# Patient Record
Sex: MALE | Race: BLACK OR AFRICAN AMERICAN | NOT HISPANIC OR LATINO | URBAN - METROPOLITAN AREA
[De-identification: names, ages, dates, MRNs, and addresses within clinical notes are randomized per-mention and may not be internally consistent; named-entity substitution may affect disease eponyms.]

---

## 2018-04-22 ENCOUNTER — INPATIENT (INPATIENT)
Facility: HOSPITAL | Age: 63
LOS: 6 days | Discharge: HOME | DRG: 26 | End: 2018-04-29
Attending: NEUROLOGICAL SURGERY | Admitting: NEUROLOGICAL SURGERY
Payer: COMMERCIAL

## 2018-04-22 VITALS
TEMPERATURE: 98 F | OXYGEN SATURATION: 97 % | DIASTOLIC BLOOD PRESSURE: 118 MMHG | HEART RATE: 119 BPM | SYSTOLIC BLOOD PRESSURE: 165 MMHG | RESPIRATION RATE: 20 BRPM

## 2018-04-22 DIAGNOSIS — I62.00 NONTRAUMATIC SUBDURAL HEMORRHAGE, UNSPECIFIED: ICD-10-CM

## 2018-04-22 LAB
ALBUMIN SERPL ELPH-MCNC: 4.1 G/DL — SIGNIFICANT CHANGE UP (ref 3.3–5)
ALBUMIN SERPL ELPH-MCNC: 4.4 G/DL — SIGNIFICANT CHANGE UP (ref 3.3–5)
ALP SERPL-CCNC: 68 U/L — SIGNIFICANT CHANGE UP (ref 40–120)
ALP SERPL-CCNC: 68 U/L — SIGNIFICANT CHANGE UP (ref 40–120)
ALT FLD-CCNC: 18 U/L — SIGNIFICANT CHANGE UP (ref 10–45)
ALT FLD-CCNC: 18 U/L — SIGNIFICANT CHANGE UP (ref 10–45)
ANION GAP SERPL CALC-SCNC: 18 MMOL/L — HIGH (ref 5–17)
APTT BLD: 30.8 SEC — SIGNIFICANT CHANGE UP (ref 27.5–37.4)
AST SERPL-CCNC: 14 U/L — SIGNIFICANT CHANGE UP (ref 10–40)
AST SERPL-CCNC: 15 U/L — SIGNIFICANT CHANGE UP (ref 10–40)
BASOPHILS # BLD AUTO: 0 K/UL — SIGNIFICANT CHANGE UP (ref 0–0.2)
BASOPHILS NFR BLD AUTO: 0.4 % — SIGNIFICANT CHANGE UP (ref 0–2)
BILIRUB DIRECT SERPL-MCNC: 0.2 MG/DL — SIGNIFICANT CHANGE UP (ref 0–0.2)
BILIRUB INDIRECT FLD-MCNC: 0.5 MG/DL — SIGNIFICANT CHANGE UP (ref 0.2–1)
BILIRUB SERPL-MCNC: 0.6 MG/DL — SIGNIFICANT CHANGE UP (ref 0.2–1.2)
BILIRUB SERPL-MCNC: 0.7 MG/DL — SIGNIFICANT CHANGE UP (ref 0.2–1.2)
BLD GP AB SCN SERPL QL: NEGATIVE — SIGNIFICANT CHANGE UP
BUN SERPL-MCNC: 12 MG/DL — SIGNIFICANT CHANGE UP (ref 7–23)
CALCIUM SERPL-MCNC: 9.5 MG/DL — SIGNIFICANT CHANGE UP (ref 8.4–10.5)
CHLORIDE SERPL-SCNC: 104 MMOL/L — SIGNIFICANT CHANGE UP (ref 96–108)
CO2 SERPL-SCNC: 22 MMOL/L — SIGNIFICANT CHANGE UP (ref 22–31)
CREAT SERPL-MCNC: 1.27 MG/DL — SIGNIFICANT CHANGE UP (ref 0.5–1.3)
EOSINOPHIL # BLD AUTO: 0 K/UL — SIGNIFICANT CHANGE UP (ref 0–0.5)
EOSINOPHIL NFR BLD AUTO: 0.3 % — SIGNIFICANT CHANGE UP (ref 0–6)
GLUCOSE SERPL-MCNC: 96 MG/DL — SIGNIFICANT CHANGE UP (ref 70–99)
HCT VFR BLD CALC: 43.6 % — SIGNIFICANT CHANGE UP (ref 39–50)
HGB BLD-MCNC: 14.5 G/DL — SIGNIFICANT CHANGE UP (ref 13–17)
INR BLD: 1.12 RATIO — SIGNIFICANT CHANGE UP (ref 0.88–1.16)
LYMPHOCYTES # BLD AUTO: 2.6 K/UL — SIGNIFICANT CHANGE UP (ref 1–3.3)
LYMPHOCYTES # BLD AUTO: 27.1 % — SIGNIFICANT CHANGE UP (ref 13–44)
MAGNESIUM SERPL-MCNC: 2.2 MG/DL — SIGNIFICANT CHANGE UP (ref 1.6–2.6)
MCHC RBC-ENTMCNC: 29.7 PG — SIGNIFICANT CHANGE UP (ref 27–34)
MCHC RBC-ENTMCNC: 33.2 GM/DL — SIGNIFICANT CHANGE UP (ref 32–36)
MCV RBC AUTO: 89.7 FL — SIGNIFICANT CHANGE UP (ref 80–100)
MONOCYTES # BLD AUTO: 0.9 K/UL — SIGNIFICANT CHANGE UP (ref 0–0.9)
MONOCYTES NFR BLD AUTO: 9.7 % — SIGNIFICANT CHANGE UP (ref 2–14)
NEUTROPHILS # BLD AUTO: 5.9 K/UL — SIGNIFICANT CHANGE UP (ref 1.8–7.4)
NEUTROPHILS NFR BLD AUTO: 62.6 % — SIGNIFICANT CHANGE UP (ref 43–77)
PHOSPHATE SERPL-MCNC: 3.7 MG/DL — SIGNIFICANT CHANGE UP (ref 2.5–4.5)
PLATELET # BLD AUTO: 163 K/UL — SIGNIFICANT CHANGE UP (ref 150–400)
POTASSIUM SERPL-MCNC: 4 MMOL/L — SIGNIFICANT CHANGE UP (ref 3.5–5.3)
POTASSIUM SERPL-SCNC: 4 MMOL/L — SIGNIFICANT CHANGE UP (ref 3.5–5.3)
PROT SERPL-MCNC: 7.4 G/DL — SIGNIFICANT CHANGE UP (ref 6–8.3)
PROT SERPL-MCNC: 7.6 G/DL — SIGNIFICANT CHANGE UP (ref 6–8.3)
PROTHROM AB SERPL-ACNC: 12.1 SEC — SIGNIFICANT CHANGE UP (ref 9.8–12.7)
RBC # BLD: 4.86 M/UL — SIGNIFICANT CHANGE UP (ref 4.2–5.8)
RBC # FLD: 12.5 % — SIGNIFICANT CHANGE UP (ref 10.3–14.5)
RH IG SCN BLD-IMP: NEGATIVE — SIGNIFICANT CHANGE UP
RH IG SCN BLD-IMP: NEGATIVE — SIGNIFICANT CHANGE UP
SODIUM SERPL-SCNC: 144 MMOL/L — SIGNIFICANT CHANGE UP (ref 135–145)
WBC # BLD: 9.5 K/UL — SIGNIFICANT CHANGE UP (ref 3.8–10.5)
WBC # FLD AUTO: 9.5 K/UL — SIGNIFICANT CHANGE UP (ref 3.8–10.5)

## 2018-04-22 PROCEDURE — 70496 CT ANGIOGRAPHY HEAD: CPT | Mod: 26

## 2018-04-22 PROCEDURE — 71045 X-RAY EXAM CHEST 1 VIEW: CPT | Mod: 26

## 2018-04-22 PROCEDURE — 70450 CT HEAD/BRAIN W/O DYE: CPT | Mod: 26,59

## 2018-04-22 PROCEDURE — 99291 CRITICAL CARE FIRST HOUR: CPT

## 2018-04-22 PROCEDURE — 93010 ELECTROCARDIOGRAM REPORT: CPT

## 2018-04-22 RX ORDER — INFLUENZA VIRUS VACCINE 15; 15; 15; 15 UG/.5ML; UG/.5ML; UG/.5ML; UG/.5ML
0.5 SUSPENSION INTRAMUSCULAR ONCE
Qty: 0 | Refills: 0 | Status: COMPLETED | OUTPATIENT
Start: 2018-04-22 | End: 2018-04-22

## 2018-04-22 RX ORDER — LABETALOL HCL 100 MG
10 TABLET ORAL ONCE
Qty: 0 | Refills: 0 | Status: COMPLETED | OUTPATIENT
Start: 2018-04-22 | End: 2018-04-22

## 2018-04-22 RX ORDER — ACETAMINOPHEN 500 MG
325 TABLET ORAL EVERY 4 HOURS
Qty: 0 | Refills: 0 | Status: DISCONTINUED | OUTPATIENT
Start: 2018-04-22 | End: 2018-04-26

## 2018-04-22 RX ORDER — DEXTROSE MONOHYDRATE, SODIUM CHLORIDE, AND POTASSIUM CHLORIDE 50; .745; 4.5 G/1000ML; G/1000ML; G/1000ML
1000 INJECTION, SOLUTION INTRAVENOUS
Qty: 0 | Refills: 0 | Status: DISCONTINUED | OUTPATIENT
Start: 2018-04-22 | End: 2018-04-23

## 2018-04-22 RX ADMIN — DEXTROSE MONOHYDRATE, SODIUM CHLORIDE, AND POTASSIUM CHLORIDE 75 MILLILITER(S): 50; .745; 4.5 INJECTION, SOLUTION INTRAVENOUS at 05:07

## 2018-04-22 RX ADMIN — Medication 10 MILLIGRAM(S): at 03:04

## 2018-04-22 NOTE — ED PROVIDER NOTE - OBJECTIVE STATEMENT
Patient with hx HTN uncontrolled with meds p/w atraumatic SDH that started while he was smoking cocaine with some friends.  Lost consciousness and had seizure.  Was brought in to [facility] by friends and had another seizure, received 2mg ativan and 1g keppra.  Had CT which showed 1 frontal and 1 frontal/parietal collection with 2mm shift hematoma vs neoplasm.  Was transferred for neurosurgery evaluation.  Patient denies symptoms at this time except mild nausea, no headache Patient with hx HTN uncontrolled with meds p/w atraumatic SDH. reportedly started having a seizure while he was smoking cocaine with some friends.  Lost consciousness w seizure activity.  Was brought in to [facility] by friends and had another seizure, received 2mg ativan and 1g keppra.  Had CT which showed 1 frontal and 1 frontal/parietal collection with 2mm shift hematoma vs neoplasm.  Was transferred for neurosurgery evaluation.  Patient denies symptoms at this time except mild nausea, no headache

## 2018-04-22 NOTE — H&P ADULT - ASSESSMENT
62M p/w seizure with left extra-axial mass vs hematoma    CTH w / w/o contrast   c/w keppra  CT CAP  Q4 neurochecks  MRI w/ w/o contrast 62M p/w seizure with left extra-axial mass vs hematoma    CTH w / w/o contrast   c/w keppra  CT CAP  Q4 neurochecks  MRI w/ w/o contrast  CIWA protocol 62M p/w seizure with left extra-axial mass vs hematoma    CTA  c/w keppra  CT CAP  Q4 neurochecks  MRI w/ w/o contrast  CIWA protocol

## 2018-04-22 NOTE — ED ADULT NURSE REASSESSMENT NOTE - NS ED NURSE REASSESS COMMENT FT1
Comfort care measures performed.
Patient is sleeping, does not appear to be in distress.  Safety maintained.  Still pending bed assignment upstairs.
Patient still at CT.
Pharmacy called for IV fluids, will send down.

## 2018-04-22 NOTE — ED PROVIDER NOTE - ATTENDING CONTRIBUTION TO CARE
63 yo M transferred from [facility] for CTH demonstrating 3x5cm frontoparietal and 6x2 cm frontal lobe collections that are extra-axial collections, acute on chronic hematomas versus underlying neoplasm/malignancy causing 2mm shift.   patient 63 yo M transferred from [facility] for CTH demonstrating 3x5cm frontoparietal and 6x2 cm frontal lobe collections that are extra-axial collections, acute on chronic hematomas versus underlying neoplasm/malignancy causing 2mm shift.   patient was doing cocaine and drinking when he started seizing. he was brought to OT. seizure aborted with keppra and ativan. CTH done at [facility]. at this time patient has no complaints. he denies any HA/neck pain, cp, sob, abd pain, weakness. His speech sounds slurred but he denies any slurred speech.   PE with + dysarthria, comprehension intact, unequal smile. 5/5 strength in all 4 extremities, although R side appears weaker than the L side.  already received keppra 1 g at [facility]. 61 yo M transferred from [facility] for CTH demonstrating 3x5cm frontoparietal and 6x2 cm frontal lobe collections that are extra-axial collections, acute on chronic hematomas versus underlying neoplasm/malignancy causing 2mm shift.   patient was doing cocaine and drinking when he started seizing. he was brought to [facility]. seizure aborted with keppra 1g and ativan. CTH done at [facility]. at this time patient has no complaints. he denies any HA/neck pain, cp, sob, abd pain, weakness. His speech sounds slurred but he denies any slurred speech.   PE with + dysarthria, comprehension intact, unequal smile. 5/5 strength in all 4 extremities, although R side appears weaker than the L side.  already received keppra 1 g at [facility].  Repeat CT head and CTA was obtained per the request. They did not recommend initiation of any other medications. Patient was admitted to [facility]  service in stable condition    Based on patient's HPI as well as the results of today's workup, I felt that patient warranted admission to the hospital for further workup/evaluation and continued management. I discussed the findings of today's workup with the patient and addressed the patient's questions and concerns. The patient was agreeable with admission. I spoke with [facility] who accepted the patient for admission and subsequently took over the patient's care.

## 2018-04-22 NOTE — H&P ADULT - HISTORY OF PRESENT ILLNESS
This is a 62 year old male tx from [facility] history of cocaine use / ETOH abuse presented intially with seizures. Was given keppra / ativan with effect. CTH was completed at outside hospital with suggestion of chronic hematoma vs left extraxial mass. There is 2 mm of shift. On presentation at [facility] he is GCS 15. Severely dysarthric. Denies HA, cervical pain, blurry vision, nausea or vomiting.    PMHX: none    Meds: none    SH: cocaine abuse, lives in far [place]	    CTH: left frontal and high left fronto/parietal extraxial lesions measuring 5.8cm and 5.9 cm respectively. This is a 62 year old male tx from [facility] history of cocaine use / ETOH abuse / marijuana use presented initially with seizures. Was found shaking, drooling with eyes open, unable to speak for 1 hour. Had similar episode as per friend prior in apartment prior to calling 911. Was given keppra / ativan with effect. CTH was completed at outside hospital with suggestion of chronic hematoma vs left extraxial mass. There is 2 mm of shift. On presentation at [facility] he is GCS 15. Severely dysarthric. Denies HA, cervical pain, blurry vision, nausea or vomiting. Hypertensive 203/115 in ED, was given labetalol with effect.     PMHX: none    Meds: none    SH: cocaine abuse, lives in far [place]	    CTH: left frontal and high left fronto/parietal extraxial lesions vs SDH measuring 5.8cm and 5.9 cm respectively. This is a 62 year old male tx from [facility] history of cocaine use / ETOH abuse / marijuana use presented initially with seizures. Was found shaking, drooling with eyes open, unable to speak for 1 hour. Had similar episode as per friend prior in apartment prior to calling 911. Was given keppra / ativan with effect. CTH was completed at outside hospital with suggestion of chronic hematoma vs left extraxial mass. There is 2 mm of shift. On presentation at [facility] he is GCS 15. Severely dysarthric. Denies HA, cervical pain, blurry vision, nausea or vomiting. Hypertensive 203/115 in ED, was given labetalol with effect. Afebrile. No leukocytosis.     PMHX: none    Meds: none    SH: cocaine abuse, lives in far [place]	    CTH: left frontal and high left fronto/parietal extraxial lesions vs SDH measuring 5.8cm and 5.9 cm respectively.

## 2018-04-22 NOTE — ED PROVIDER NOTE - CRITICAL CARE PROVIDED
additional history taking/consultation with other physicians/consult w/ pt's family directly relating to pts condition/direct patient care (not related to procedure)/interpretation of diagnostic studies/documentation

## 2018-04-22 NOTE — ED PROVIDER NOTE - NEUROLOGICAL, MLM
Alert and oriented, speech normal, CN II-XII intact except slight L lower facial droop, no focal deficits, no motor or sensory deficits, no pronator drift, finger to nose testing normal Alert and oriented, speech slurred, CN II-XII intact except slight L lower facial droop, no focal deficits, no motor or sensory deficits, no pronator drift, finger to nose testing normal

## 2018-04-22 NOTE — ED PROVIDER NOTE - CARE PLAN
Principal Discharge DX:	SDH (subdural hematoma) Principal Discharge DX:	SDH (subdural hematoma)  Secondary Diagnosis:	Cocaine abuse  Secondary Diagnosis:	Seizure

## 2018-04-22 NOTE — PROGRESS NOTE ADULT - ASSESSMENT
HPI:  This is a 62 year old male tx from [facility] history of cocaine use / ETOH abuse / marijuana use presented initially with seizures, CTH demonstrates L extra axial collection, likely subdural.   -Repeat CTH today   - q. 4 hour neuro checks   - CIWA   - Medical clearance for possible OR for drainage   - pain control This is a 62 year old male tx from [facility] history of cocaine use / ETOH abuse / marijuana use presented initially with seizures, CTH demonstrates L extra axial collection, likely subdural.   -Repeat CTH today   - q. 4 hour neuro checks   - CIWA   - Medical clearance for possible OR for drainage   - pain control

## 2018-04-22 NOTE — PROGRESS NOTE ADULT - SUBJECTIVE AND OBJECTIVE BOX
Overnight Events: None    Subjective: Patient seen and examined at bedside    Vital Signs Last 24 Hrs  T(C): 36.4 (22 Apr 2018 08:57), Max: 36.7 (22 Apr 2018 02:53)  T(F): 97.6 (22 Apr 2018 08:57), Max: 98 (22 Apr 2018 02:53)  HR: 71 (22 Apr 2018 08:57) (71 - 119)  BP: 166/94 (22 Apr 2018 08:57) (147/93 - 172/113)  BP(mean): --  RR: 18 (22 Apr 2018 08:57) (18 - 20)  SpO2: 94% (22 Apr 2018 08:57) (94% - 99%)                          14.5   9.5   )-----------( 163      ( 22 Apr 2018 03:07 )             43.6    04-22    144  |  104  |  12  ----------------------------<  96  4.0   |  22  |  1.27    Ca    9.5      22 Apr 2018 03:07  Phos  3.7     04-22  Mg     2.2     04-22    TPro  7.4  /  Alb  4.1  /  TBili  0.7  /  DBili  0.2  /  AST  14  /  ALT  18  /  AlkPhos  68  04-22  PT/INR - ( 22 Apr 2018 03:07 )   PT: 12.1 sec;   INR: 1.12 ratio         PTT - ( 22 Apr 2018 03:07 )  PTT:30.8 sec   Stroke Core Measures        DRAIN OUTPUT:     NEUROIMAGING:     PHYSICAL EXAM:    Constitutional: No Acute Distress     Neurological: AOx3, Following Commands, Moving all Extremities     Motor exam:          Upper extremity                         Delt     Bicep     Tricep    HG                                                 R         5/5        5/5        5/5       5/5                                               L          5/5        5/5        5/5       5/5          Lower extremity                        HF         KF        KE       DF         PF                                                  R        5/5        5/5        5/5       5/5         5/5                                               L         5/5        5/5       5/5       5/5          5/5                                                 Sensation: [X] intact to light touch  [] decreased:     Pulm: No respiratroy distress or accessory muscle use    Gastrointestinal: Soft, Non-tender, Non-distended     Extremities: No calf tenderness       MEDICATIONS:   Antibiotics:    Neuro:  acetaminophen   Tablet. 325 milliGRAM(s) Oral every 4 hours PRN Mild Pain (1 - 3)  LORazepam   Injectable 2 milliGRAM(s) IV Push every 2 hours PRN CIWA-Ar score increase by 2 points and a total score of 7 or less    Anticoagulation:    Cardiology:    Endo:     Pulm:    GI/:    Other:  sodium chloride 0.9% with potassium chloride 20 mEq/L 1000 milliLiter(s) IV Continuous <Continuous> Overnight Events: None    Subjective: Patient seen and examined at bedside    Vital Signs Last 24 Hrs  T(C): 36.4 (22 Apr 2018 08:57), Max: 36.7 (22 Apr 2018 02:53)  T(F): 97.6 (22 Apr 2018 08:57), Max: 98 (22 Apr 2018 02:53)  HR: 71 (22 Apr 2018 08:57) (71 - 119)  BP: 166/94 (22 Apr 2018 08:57) (147/93 - 172/113)  BP(mean): --  RR: 18 (22 Apr 2018 08:57) (18 - 20)  SpO2: 94% (22 Apr 2018 08:57) (94% - 99%)                          14.5   9.5   )-----------( 163      ( 22 Apr 2018 03:07 )             43.6    04-22    144  |  104  |  12  ----------------------------<  96  4.0   |  22  |  1.27    Ca    9.5      22 Apr 2018 03:07  Phos  3.7     04-22  Mg     2.2     04-22    TPro  7.4  /  Alb  4.1  /  TBili  0.7  /  DBili  0.2  /  AST  14  /  ALT  18  /  AlkPhos  68  04-22  PT/INR - ( 22 Apr 2018 03:07 )   PT: 12.1 sec;   INR: 1.12 ratio         PTT - ( 22 Apr 2018 03:07 )  PTT:30.8 sec   Stroke Core Measures        DRAIN OUTPUT:     NEUROIMAGING:     PHYSICAL EXAM:    Constitutional: No Acute Distress     Neurological: AOx3, Following Commands, Moving all Extremities     Motor exam:          Upper extremity                         Delt     Bicep     Tricep    HG                                                 R         5/5        5/5        5/5       5/5                                               L          5/5        5/5        5/5       5/5          Lower extremity                        HF         KF        KE       DF         PF                                                  R        5/5        5/5        5/5       5/5         5/5                                               L         5/5        5/5       5/5       5/5          5/5                                                 Sensation: [X] intact to light touch  [] decreased:     Pulm: No respiratroy distress or accessory muscle use    Gastrointestinal: Soft, Non-tender, Non-distended     Extremities: No calf tenderness       MEDICATIONS:   Antibiotics:    Neuro:  acetaminophen   Tablet. 325 milliGRAM(s) Oral every 4 hours PRN Mild Pain (1 - 3)  LORazepam   Injectable 2 milliGRAM(s) IV Push every 2 hours PRN CIWA-Ar score increase by 2 points and a total score of 7 or less    Anticoagulation:    Cardiology:    Endo:     Pulm:    GI/:    Other:  sodium chloride 0.9% with potassium chloride 20 mEq/L 1000 milliLiter(s) IV Continuous <Continuous>      Imaging:   < from: CT Head No Cont (04.22.18 @ 04:51) >  Impression: Chronic left frontal-parietal subdural hematoma with mass   effect exerted on the subjacent brain, described. There are acute   components of the subdural hematoma characterized by higher density. CTA   of the head demonstrates no abnormalities.    < end of copied text >

## 2018-04-22 NOTE — ED ADULT NURSE NOTE - OBJECTIVE STATEMENT
Patient transferred from [facility] for subdural hematoma.  Patient is daily drinker, last known drink witnessed by friend at bedside was 2100 prior to bringing patient Patient transferred from [facility] for subdural hematoma.  Per EMS, patient was brought to [facility] ER by friends who witnessed tonic seizure at home.  Patient also had witnessed episode of tonic seizure in ER.  Patient received Ativan prior to transport due to tachycardia and hypertension.  Upon arrival to this ED, patient hypertensive 160s systollically and 110s diastollically.  Patient is sleepy but easily arousable, A&O x3 and following commands.  Upon assessment weakness noted to upper R and lower R extremities.  Patient is daily drinker (Claudia), last known drink witnessed by friend at bedside was 2100 prior to bringing patient to hospital.  Patient admits to using marijuana and cocaine.  Patient arrives with 20G to LA that is patent.  ED Attending at the bedside for evaluation. Patient transferred from [facility] for subdural hematoma.  Per EMS, patient was brought to [facility] ER by friends who witnessed tonic seizure at home.  Patient also had witnessed episode of tonic seizure in ER.  Patient received Keppra PTA.  Received Ativan prior to transport due to tachycardia and hypertension.  Upon arrival to this ED, patient hypertensive 160s systollically and 110s diastollically.  Patient is drowsy but easily arousable, A&O x3 and following commands.  Upon assessment weakness noted to upper R and lower R extremities.  Patient is daily drinker (Claudia), last known drink witnessed by friend at bedside was 2100 prior to bringing patient to hospital.  Patient admits to using marijuana and cocaine.  Patient arrives with 20G to LA that is patent.  ED Attending at the bedside for evaluation.

## 2018-04-23 DIAGNOSIS — F10.10 ALCOHOL ABUSE, UNCOMPLICATED: ICD-10-CM

## 2018-04-23 DIAGNOSIS — F14.10 COCAINE ABUSE, UNCOMPLICATED: ICD-10-CM

## 2018-04-23 DIAGNOSIS — E83.39 OTHER DISORDERS OF PHOSPHORUS METABOLISM: ICD-10-CM

## 2018-04-23 DIAGNOSIS — I10 ESSENTIAL (PRIMARY) HYPERTENSION: ICD-10-CM

## 2018-04-23 DIAGNOSIS — Z29.9 ENCOUNTER FOR PROPHYLACTIC MEASURES, UNSPECIFIED: ICD-10-CM

## 2018-04-23 DIAGNOSIS — K92.1 MELENA: ICD-10-CM

## 2018-04-23 DIAGNOSIS — I62.00 NONTRAUMATIC SUBDURAL HEMORRHAGE, UNSPECIFIED: ICD-10-CM

## 2018-04-23 DIAGNOSIS — R79.89 OTHER SPECIFIED ABNORMAL FINDINGS OF BLOOD CHEMISTRY: ICD-10-CM

## 2018-04-23 LAB
MAGNESIUM SERPL-MCNC: 2.3 MG/DL — SIGNIFICANT CHANGE UP (ref 1.6–2.6)
PHOSPHATE SERPL-MCNC: 2.2 MG/DL — LOW (ref 2.5–4.5)

## 2018-04-23 PROCEDURE — 95819 EEG AWAKE AND ASLEEP: CPT | Mod: 26

## 2018-04-23 PROCEDURE — 99223 1ST HOSP IP/OBS HIGH 75: CPT

## 2018-04-23 RX ORDER — THIAMINE MONONITRATE (VIT B1) 100 MG
500 TABLET ORAL EVERY 8 HOURS
Qty: 0 | Refills: 0 | Status: DISCONTINUED | OUTPATIENT
Start: 2018-04-23 | End: 2018-04-26

## 2018-04-23 RX ORDER — DOCUSATE SODIUM 100 MG
100 CAPSULE ORAL THREE TIMES A DAY
Qty: 0 | Refills: 0 | Status: DISCONTINUED | OUTPATIENT
Start: 2018-04-23 | End: 2018-04-26

## 2018-04-23 RX ORDER — DEXTROSE MONOHYDRATE, SODIUM CHLORIDE, AND POTASSIUM CHLORIDE 50; .745; 4.5 G/1000ML; G/1000ML; G/1000ML
1000 INJECTION, SOLUTION INTRAVENOUS
Qty: 0 | Refills: 0 | Status: DISCONTINUED | OUTPATIENT
Start: 2018-04-23 | End: 2018-04-26

## 2018-04-23 RX ORDER — CALCIUM ACETATE 667 MG
667 TABLET ORAL
Qty: 0 | Refills: 0 | Status: DISCONTINUED | OUTPATIENT
Start: 2018-04-23 | End: 2018-04-23

## 2018-04-23 RX ORDER — SODIUM,POTASSIUM PHOSPHATES 278-250MG
1 POWDER IN PACKET (EA) ORAL
Qty: 0 | Refills: 0 | Status: COMPLETED | OUTPATIENT
Start: 2018-04-23 | End: 2018-04-23

## 2018-04-23 RX ORDER — SENNA PLUS 8.6 MG/1
2 TABLET ORAL AT BEDTIME
Qty: 0 | Refills: 0 | Status: DISCONTINUED | OUTPATIENT
Start: 2018-04-23 | End: 2018-04-26

## 2018-04-23 RX ORDER — THIAMINE MONONITRATE (VIT B1) 100 MG
100 TABLET ORAL DAILY
Qty: 0 | Refills: 0 | Status: DISCONTINUED | OUTPATIENT
Start: 2018-04-23 | End: 2018-04-23

## 2018-04-23 RX ORDER — AMLODIPINE BESYLATE 2.5 MG/1
5 TABLET ORAL DAILY
Qty: 0 | Refills: 0 | Status: DISCONTINUED | OUTPATIENT
Start: 2018-04-23 | End: 2018-04-26

## 2018-04-23 RX ORDER — LEVETIRACETAM 250 MG/1
1000 TABLET, FILM COATED ORAL
Qty: 0 | Refills: 0 | Status: DISCONTINUED | OUTPATIENT
Start: 2018-04-23 | End: 2018-04-26

## 2018-04-23 RX ADMIN — Medication 1 TABLET(S): at 17:29

## 2018-04-23 RX ADMIN — Medication 105 MILLIGRAM(S): at 22:05

## 2018-04-23 RX ADMIN — Medication 1 TABLET(S): at 17:26

## 2018-04-23 RX ADMIN — Medication 1 TABLET(S): at 12:41

## 2018-04-23 RX ADMIN — AMLODIPINE BESYLATE 5 MILLIGRAM(S): 2.5 TABLET ORAL at 17:51

## 2018-04-23 RX ADMIN — Medication 1 TABLET(S): at 21:49

## 2018-04-23 RX ADMIN — Medication 100 MILLIGRAM(S): at 13:11

## 2018-04-23 RX ADMIN — DEXTROSE MONOHYDRATE, SODIUM CHLORIDE, AND POTASSIUM CHLORIDE 75 MILLILITER(S): 50; .745; 4.5 INJECTION, SOLUTION INTRAVENOUS at 05:32

## 2018-04-23 RX ADMIN — LEVETIRACETAM 1000 MILLIGRAM(S): 250 TABLET, FILM COATED ORAL at 05:32

## 2018-04-23 RX ADMIN — LEVETIRACETAM 1000 MILLIGRAM(S): 250 TABLET, FILM COATED ORAL at 17:51

## 2018-04-23 RX ADMIN — Medication 667 MILLIGRAM(S): at 13:11

## 2018-04-23 RX ADMIN — Medication 100 MILLIGRAM(S): at 21:49

## 2018-04-23 NOTE — CONSULT NOTE ADULT - SUBJECTIVE AND OBJECTIVE BOX
Dr Sandor Salinas     PMD:    Patient is a 62y old  Male who presents with a chief complaint of seizure (22 Apr 2018 03:09)      HPI:  62 year old male tx from [facility] history of cocaine use / ETOH abuse / marijuana use presented initially with seizures. Was found shaking, drooling with eyes open, unable to speak for 1 hour. Had similar episode as per friend prior in apartment prior to calling 911. Was given keppra / ativan with effect. CTH was completed at outside hospital with suggestion of chronic hematoma vs left extraxial mass. There is 2 mm of shift. On presentation at [facility] he is GCS 15. Severely dysarthric. Denies HA, cervical pain, blurry vision, nausea or vomiting. Hypertensive 203/115 in ED, was given labetalol with effect. Afebrile. No leukocytosis.   CTH: left frontal and high left fronto/parietal extraxial lesions vs SDH measuring 5.8cm and 5.9 cm respectively. (22 Apr 2018 03:09)    History limited due to: [ ] Dementia  [ ] Delirium  [ ] Condition    Pain Symptoms if applicable:             	                         none	   mild         moderate         severe  Pain:	                            0	    1-3	     4-6	         7-10  Location:	  Modifying factors:	  Associated symptoms:	    Function: [ ] Independent  [ ] Assistance  [ ] Total care  [ ] Non-ambulatory  On antiplatelet or anticoagulation? [ ] YES [ ] NO  Prior anesthesia:  DASI: METS:       Allergies    No Known Allergies    Intolerances        HOME MEDICATIONS: [ ] Reviewed  Home Medications:      MEDICATIONS  (STANDING):  calcium acetate 667 milliGRAM(s) Oral three times a day with meals  docusate sodium 100 milliGRAM(s) Oral three times a day  influenza   Vaccine 0.5 milliLiter(s) IntraMuscular once  levETIRAcetam 1000 milliGRAM(s) Oral two times a day  multivitamin 1 Tablet(s) Oral daily  senna 2 Tablet(s) Oral at bedtime  thiamine 100 milliGRAM(s) Oral daily    MEDICATIONS  (PRN):  acetaminophen   Tablet. 325 milliGRAM(s) Oral every 4 hours PRN Mild Pain (1 - 3)  LORazepam   Injectable 2 milliGRAM(s) IV Push every 2 hours PRN CIWA-Ar score increase by 2 points and a total score of 7 or less      PAST MEDICAL & SURGICAL HISTORY:  HTN (hypertension)  No significant past surgical history  [ ] Reviewed     SOCIAL HISTORY:  Residence: [ ] long term  [ ] SNF  [x ] [facility]  cocaine abuse, lives in [place]	      FAMILY HISTORY:  [ ] No pertinent family history in first degree relatives     REVIEW OF SYSTEMS:    CONSTITUTIONAL: No fever, weight loss, or fatigue  EYES: No eye pain, visual disturbances, or discharge  ENMT:  No difficulty hearing, tinnitus, vertigo; No sinus or throat pain  NECK: No pain or stiffness  BREASTS: No pain, masses, or nipple discharge  RESPIRATORY: No cough, wheezing, chills or hemoptysis; No shortness of breath  CARDIOVASCULAR: No chest pain, palpitations, dizziness, or leg swelling  GASTROINTESTINAL: No abdominal or epigastric pain. No nausea, vomiting, or hematemesis; No diarrhea or constipation. No melena or hematochezia.  GENITOURINARY: No dysuria, frequency, hematuria, or incontinence  NEUROLOGICAL: No headaches, memory loss, loss of strength, numbness, or tremors  SKIN: No itching, burning, rashes, or lesions   LYMPH NODES: No enlarged glands  ENDOCRINE: No heat or cold intolerance; No hair loss  MUSCULOSKELETAL: No muscle or back pain  PSYCHIATRIC: No depression, anxiety, mood swings, or difficulty sleeping  HEME/LYMPH: No easy bruising, or bleeding gums  ALLERGY AND IMMUNOLOGIC: No hives or eczema    [  ] All other ROS negative  [  ] Unable to obtain due to poor mental status    Vital Signs Last 24 Hrs  T(C): 36.9 (23 Apr 2018 08:20), Max: 36.9 (23 Apr 2018 08:20)  T(F): 98.5 (23 Apr 2018 08:20), Max: 98.5 (23 Apr 2018 08:20)  HR: 78 (23 Apr 2018 08:20) (70 - 87)  BP: 133/70 (23 Apr 2018 08:20) (133/70 - 160/90)  BP(mean): --  RR: 18 (23 Apr 2018 08:20) (18 - 18)  SpO2: 96% (23 Apr 2018 08:20) (95% - 100%)    PHYSICAL EXAM:    GENERAL: NAD, well-groomed, well-developed  HEAD:  Atraumatic, Normocephalic  EYES: EOMI, PERRLA, conjunctiva and sclera clear  ENMT: Moist mucous membranes  NECK: Supple, No JVD  RESPIRATORY: Clear to auscultation bilaterally; No rales, rhonchi, wheezing, or rubs  CARDIOVASCULAR: Regular rate and rhythm; No murmurs, rubs, or gallops  GASTROINTESTINAL: Soft, Nontender, Nondistended; Bowel sounds present  GENITOURINARY: Not examined  EXTREMITIES:  2+ Peripheral Pulses, No clubbing, cyanosis, or edema  NEURO:  Alert & Oriented X3; Moving all 4 extremities; No gross sensory deficits  HEME/LYMPH: No lymphadenopathy noted  SKIN: No rashes or lesions; Incisions C/D/I    LABS:                        14.5   9.5   )-----------( 163      ( 22 Apr 2018 03:07 )             43.6     04-22    144  |  104  |  12  ----------------------------<  96  4.0   |  22  |  1.27    Ca    9.5      22 Apr 2018 03:07  Phos  2.2     04-23  Mg     2.3     04-23    TPro  7.4  /  Alb  4.1  /  TBili  0.7  /  DBili  0.2  /  AST  14  /  ALT  18  /  AlkPhos  68  04-22    PT/INR - ( 22 Apr 2018 03:07 )   PT: 12.1 sec;   INR: 1.12 ratio         PTT - ( 22 Apr 2018 03:07 )  PTT:30.8 sec    CAPILLARY BLOOD GLUCOSE            RADIOLOGY & ADDITIONAL STUDIES:    EKG:   Personally Reviewed:  [x ] YES NSR - small ?J point repolarization inferiolaterally   Imaging:   Personally Reviewed:  [x ] YES < from: Xray Chest 1 View- PORTABLE-Urgent (04.22.18 @ 03:10) >  Clear lungs.    < end of copied text >  < from: CT Angio Head w/ IV Cont (04.22.18 @ 04:54) >  Impression: Chronic left frontal-parietal subdural hematoma with mass   effect exerted on the subjacent brain, described. There are acute   components of the subdural hematoma characterized by higher density. CTA   of the head demonstrates no abnormalities.    < end of copied text >  < from: CT Head No Cont (04.22.18 @ 10:28) >  Unchanged loculated acute on chronic subdural hematoma.    < end of copied text >                Consultant(s) notes reviewed:    Care Discussed with Consultant(s)/Other Providers:    Advanced Directives: [ ] DNR  [ ] No feeding tube  [ ] MOLST in chart  [ ] MOLST completed today  [ ] Unknown    [ ] Probable osteoporosis  [ ] Possible osteomalacia  [ ] Increased delirium risk  [ ] Delirium and other risks can be reduced by:          -early ambulation          -minimizing "tethers" - IV, oxygen, catheters, etc          -avoiding hypnotics and sedatives          -maintaining hydration/nutrition          -avoid anticholinergics - diphenhydramine, etc          -pain control          -supportive environment Dr Sandor Salinas     PMD: Alexis [facility]    Patient is a 62y old  Male who presents with a chief complaint of seizure (22 Apr 2018 03:09)      HPI:  62M transferred from [facility], history of cocaine use / ETOH abuse / marijuana use presented initially with seizures. Was found shaking, drooling with eyes open, unable to speak for 1 hour. Had similar episode as per friend prior in apartment prior to calling 911. Was given keppra / ativan with effect. CTH was completed at outside hospital with suggestion of chronic hematoma vs left extraxial mass. There is 2 mm of shift. He had witnessed seizure in [facility] ER per records. On presentation at [facility] he is GCS 15. Severely dysarthric. Denies HA, cervical pain, blurry vision, nausea or vomiting. Hypertensive 203/115 in ED, was given labetalol with effect. Afebrile. No leukocytosis.   CTH: left frontal and high left fronto/parietal extraxial lesions vs SDH measuring 5.8cm and 5.9 cm respectively. (22 Apr 2018 03:09)    Pt admits to years of ETOH, cocaine, marijuana use. He says he uses cocaine monthly and has never been hospitalized for cocaine related effects. He says last cocaine use was 1 week ago. He states he drinks E&J louise near daily throughout the day, has never been hospitalized for adverse consequences of alcoholism, never had withdrawal or seizures, says does not get shakes or other withdrawal symptoms when he takes a day  or days off from drinking, which pt states is frequently done.     He has no n/v/d/dysuria/vision changes. Pt notes occasional blood in stool for last few decades, no weakness or lightheadedness. He exercises occasionally with pushups and other strength conditioning. He lives at home with girlfriend  No recent medical care.    Function: [x ] Independent  [ ] Assistance  [ ] Total care  [ ] Non-ambulatory  On antiplatelet or anticoagulation? [ ] YES [ x] NO  Prior anesthesia: never  DASI: 24 METS: 5      Allergies    No Known Allergies    Intolerances        HOME MEDICATIONS: [x ] Reviewed  Home Medications: none      MEDICATIONS  (STANDING):  calcium acetate 667 milliGRAM(s) Oral three times a day with meals  docusate sodium 100 milliGRAM(s) Oral three times a day  influenza   Vaccine 0.5 milliLiter(s) IntraMuscular once  levETIRAcetam 1000 milliGRAM(s) Oral two times a day  multivitamin 1 Tablet(s) Oral daily  senna 2 Tablet(s) Oral at bedtime  thiamine 100 milliGRAM(s) Oral daily    MEDICATIONS  (PRN):  acetaminophen   Tablet. 325 milliGRAM(s) Oral every 4 hours PRN Mild Pain (1 - 3)  LORazepam   Injectable 2 milliGRAM(s) IV Push every 2 hours PRN CIWA-Ar score increase by 2 points and a total score of 7 or less      PAST MEDICAL & SURGICAL HISTORY:  No significant past medical history  No significant past surgical history  [x ] Reviewed     SOCIAL HISTORY:  Residence: [ ] [facility]  [ ] [facility]  [x ] [facility]  marijuana cocaine abuse as above lives in [place], not currently employed      FAMILY HISTORY:  [x ] No pertinent family history in first degree relatives     REVIEW OF SYSTEMS:    CONSTITUTIONAL: No fever, weight loss, or fatigue  EYES: No eye pain, visual disturbances, or discharge  ENMT:  No difficulty hearing, tinnitus, vertigo; No sinus or throat pain  NECK: No pain or stiffness  RESPIRATORY: No cough, wheezing, chills or hemoptysis; No shortness of breath  CARDIOVASCULAR: No chest pain, palpitations, dizziness, or leg swelling  GASTROINTESTINAL: No abdominal or epigastric pain. No nausea, vomiting, or hematemesis; No diarrhea or constipation. +occ hematochezia as above  GENITOURINARY: No dysuria, frequency, hematuria, or incontinence  NEUROLOGICAL: No headaches, memory loss, loss of strength, numbness, or tremors. Sz as above  SKIN: No itching, burning, rashes, or lesions   MUSCULOSKELETAL: No muscle or back pain  PSYCHIATRIC: polysubstance abuse    [x  ] All other ROS negative  [  ] Unable to obtain due to poor mental status    Vital Signs Last 24 Hrs  T(C): 36.9 (23 Apr 2018 08:20), Max: 36.9 (23 Apr 2018 08:20)  T(F): 98.5 (23 Apr 2018 08:20), Max: 98.5 (23 Apr 2018 08:20)  HR: 78 (23 Apr 2018 08:20) (70 - 87)  BP: 133/70 (23 Apr 2018 08:20) (133/70 - 160/90)  BP(mean): --  RR: 18 (23 Apr 2018 08:20) (18 - 18)  SpO2: 96% (23 Apr 2018 08:20) (95% - 100%)    PHYSICAL EXAM:    GENERAL: NAD, well-groomed, well-developed  HEAD:  Atraumatic, Normocephalic  EYES: EOMI, PERRLA, conjunctiva and sclera clear  ENMT: Moist mucous membranes  NECK: Supple, No JVD  RESPIRATORY: Clear to auscultation bilaterally; No rales, rhonchi, wheezing, or rubs  CARDIOVASCULAR: Regular rate and rhythm; No murmurs, rubs, or gallops  GASTROINTESTINAL: Soft, Nontender, Nondistended; Bowel sounds present  EXTREMITIES:  2+ Peripheral Pulses, No clubbing, cyanosis, or edema  NEURO:  Alert & Oriented X3; Moving all 4 extremities; No gross sensory deficits no pronator drift  HEME/LYMPH: No lymphadenopathy noted  SKIN: No rashes or lesions; Incisions C/D/I    LABS:                        14.5   9.5   )-----------( 163      ( 22 Apr 2018 03:07 )             43.6     04-22    144  |  104  |  12  ----------------------------<  96  4.0   |  22  |  1.27    Ca    9.5      22 Apr 2018 03:07  Phos  2.2     04-23  Mg     2.3     04-23    TPro  7.4  /  Alb  4.1  /  TBili  0.7  /  DBili  0.2  /  AST  14  /  ALT  18  /  AlkPhos  68  04-22    PT/INR - ( 22 Apr 2018 03:07 )   PT: 12.1 sec;   INR: 1.12 ratio         PTT - ( 22 Apr 2018 03:07 )  PTT:30.8 sec    CAPILLARY BLOOD GLUCOSE            RADIOLOGY & ADDITIONAL STUDIES:    EKG:   Personally Reviewed:  [x ] YES NSR - small ?J point repolarization inferiolaterally   Imaging:   Personally Reviewed:  [x ] YES < from: Xray Chest 1 View- PORTABLE-Urgent (04.22.18 @ 03:10) >  Clear lungs.    < end of copied text >  < from: CT Angio Head w/ IV Cont (04.22.18 @ 04:54) >  Impression: Chronic left frontal-parietal subdural hematoma with mass   effect exerted on the subjacent brain, described. There are acute   components of the subdural hematoma characterized by higher density. CTA   of the head demonstrates no abnormalities.    < end of copied text >  < from: CT Head No Cont (04.22.18 @ 10:28) >  Unchanged loculated acute on chronic subdural hematoma.    < end of copied text >                Consultant(s) notes reviewed:    Care Discussed with Consultant(s)/Other Providers: Dr Sandor Salinas     PMD: Alexis [facility]    Patient is a 62y old  Male who presents with a chief complaint of seizure (22 Apr 2018 03:09)      HPI:  62M transferred from [facility], history of cocaine use / ETOH abuse / marijuana use presented initially with seizures. Was found shaking, drooling with eyes open, unable to speak for 1 hour. Had similar episode as per friend prior in apartment prior to calling 911. Was given keppra / ativan with effect. CTH was completed at outside hospital with suggestion of chronic hematoma vs left extraxial mass. There is 2 mm of shift. He had witnessed seizure in [facility] ER per records. On presentation at [facility] he is GCS 15. Severely dysarthric. Denies HA, cervical pain, blurry vision, nausea or vomiting. Hypertensive 203/115 in ED, was given labetalol with effect. Afebrile. No leukocytosis.   CTH: left frontal and high left fronto/parietal extraxial lesions vs SDH measuring 5.8cm and 5.9 cm respectively. (22 Apr 2018 03:09)    Pt admits to years of ETOH, cocaine, marijuana use. He says he uses cocaine monthly and has never been hospitalized for cocaine related effects. He says last cocaine use was 1 week ago. He states he drinks E&J louise near daily throughout the day, has never been hospitalized for adverse consequences of alcoholism, never had withdrawal or seizures, says does not get shakes or other withdrawal symptoms when he takes a day  or days off from drinking, which pt states is frequently done.     He has no n/v/d/dysuria/vision changes. Pt notes occasional blood in stool for last few decades, no weakness or lightheadedness. He exercises occasionally with pushups and other strength conditioning. He lives at home with girlfriend  No recent medical care.    Function: [x ] Independent  [ ] Assistance  [ ] Total care  [ ] Non-ambulatory  On antiplatelet or anticoagulation? [ ] YES [ x] NO  Prior anesthesia: never  DASI: 24 METS: 5      Allergies    No Known Allergies    Intolerances        HOME MEDICATIONS: [x ] Reviewed  Home Medications: none      MEDICATIONS  (STANDING):  calcium acetate 667 milliGRAM(s) Oral three times a day with meals  docusate sodium 100 milliGRAM(s) Oral three times a day  influenza   Vaccine 0.5 milliLiter(s) IntraMuscular once  levETIRAcetam 1000 milliGRAM(s) Oral two times a day  multivitamin 1 Tablet(s) Oral daily  senna 2 Tablet(s) Oral at bedtime  thiamine 100 milliGRAM(s) Oral daily    MEDICATIONS  (PRN):  acetaminophen   Tablet. 325 milliGRAM(s) Oral every 4 hours PRN Mild Pain (1 - 3)  LORazepam   Injectable 2 milliGRAM(s) IV Push every 2 hours PRN CIWA-Ar score increase by 2 points and a total score of 7 or less      PAST MEDICAL & SURGICAL HISTORY:  No significant past medical history  No significant past surgical history  [x ] Reviewed     SOCIAL HISTORY:  Residence: [ ] [facility]  [ ] [facility]  [x ] [facility]  marijuana cocaine abuse as above lives in [place], not currently employed      FAMILY HISTORY:  [x ] No pertinent family history in first degree relatives     REVIEW OF SYSTEMS:    CONSTITUTIONAL: No fever, weight loss, or fatigue  EYES: No eye pain, visual disturbances, or discharge  ENMT:  No difficulty hearing, tinnitus, vertigo; No sinus or throat pain  NECK: No pain or stiffness  RESPIRATORY: No cough, wheezing, chills or hemoptysis; No shortness of breath  CARDIOVASCULAR: No chest pain, palpitations, dizziness, or leg swelling  GASTROINTESTINAL: No abdominal or epigastric pain. No nausea, vomiting, or hematemesis; No diarrhea or constipation. +occ hematochezia as above  GENITOURINARY: No dysuria, frequency, hematuria, or incontinence  NEUROLOGICAL: No headaches, memory loss, loss of strength, numbness, or tremors. Sz as above  SKIN: No itching, burning, rashes, or lesions   MUSCULOSKELETAL: No muscle or back pain  PSYCHIATRIC: polysubstance abuse    [x  ] All other ROS negative  [  ] Unable to obtain due to poor mental status    Vital Signs Last 24 Hrs  T(C): 36.9 (23 Apr 2018 08:20), Max: 36.9 (23 Apr 2018 08:20)  T(F): 98.5 (23 Apr 2018 08:20), Max: 98.5 (23 Apr 2018 08:20)  HR: 78 (23 Apr 2018 08:20) (70 - 87)  BP: 133/70 (23 Apr 2018 08:20) (133/70 - 160/90)  BP(mean): --  RR: 18 (23 Apr 2018 08:20) (18 - 18)  SpO2: 96% (23 Apr 2018 08:20) (95% - 100%)    PHYSICAL EXAM:    GENERAL: NAD, well-groomed, well-developed  HEAD:  Atraumatic, Normocephalic  EYES: EOMI, PERRLA, conjunctiva and sclera clear  ENMT: Moist mucous membranes  NECK: Supple, No JVD  RESPIRATORY: Clear to auscultation bilaterally; No rales, rhonchi, wheezing, or rubs  CARDIOVASCULAR: Regular rate and rhythm; No murmurs, rubs, or gallops  GASTROINTESTINAL: Soft, Nontender, Nondistended; Bowel sounds present  EXTREMITIES:  2+ Peripheral Pulses, No clubbing, cyanosis, or edema  NEURO:  Alert & Oriented X3; Moving all 4 extremities; No gross sensory deficits no pronator drift  HEME/LYMPH: No lymphadenopathy noted  SKIN: No rashes or lesions; Incisions C/D/I    LABS:                        14.5   9.5   )-----------( 163      ( 22 Apr 2018 03:07 )             43.6     04-22    144  |  104  |  12  ----------------------------<  96  4.0   |  22  |  1.27    Ca    9.5      22 Apr 2018 03:07  Phos  2.2     04-23  Mg     2.3     04-23    TPro  7.4  /  Alb  4.1  /  TBili  0.7  /  DBili  0.2  /  AST  14  /  ALT  18  /  AlkPhos  68  04-22    PT/INR - ( 22 Apr 2018 03:07 )   PT: 12.1 sec;   INR: 1.12 ratio         PTT - ( 22 Apr 2018 03:07 )  PTT:30.8 sec    CAPILLARY BLOOD GLUCOSE      outpatient records reviewed from [facility] 10.8 after sz in ED  Trop I negative      RADIOLOGY & ADDITIONAL STUDIES:    EKG:   Personally Reviewed:  [x ] YES NSR - small ?J point repolarization inferiolaterally   Imaging:   Personally Reviewed:  [x ] YES < from: Xray Chest 1 View- PORTABLE-Urgent (04.22.18 @ 03:10) >  Clear lungs.    < end of copied text >  < from: CT Angio Head w/ IV Cont (04.22.18 @ 04:54) >  Impression: Chronic left frontal-parietal subdural hematoma with mass   effect exerted on the subjacent brain, described. There are acute   components of the subdural hematoma characterized by higher density. CTA   of the head demonstrates no abnormalities.    < end of copied text >  < from: CT Head No Cont (04.22.18 @ 10:28) >  Unchanged loculated acute on chronic subdural hematoma.    < end of copied text >                Consultant(s) notes reviewed:    Care Discussed with Consultant(s)/Other Providers:

## 2018-04-23 NOTE — CONSULT NOTE ADULT - PROBLEM SELECTOR RECOMMENDATION 4
MALA eval - would check nonurgent TTE given use but no concern for acute sequelae of cocaine use at present.

## 2018-04-23 NOTE — PROGRESS NOTE ADULT - SUBJECTIVE AND OBJECTIVE BOX
SUBJECTIVE: Arousable to voice.  No complaints.  NAD.     OVERNIGHT EVENTS: None    Vital Signs Last 24 Hrs  T(C): 36.9 (23 Apr 2018 08:20), Max: 36.9 (23 Apr 2018 08:20)  T(F): 98.5 (23 Apr 2018 08:20), Max: 98.5 (23 Apr 2018 08:20)  HR: 78 (23 Apr 2018 08:20) (70 - 87)  BP: 133/70 (23 Apr 2018 08:20) (133/70 - 160/90)  BP(mean): --  RR: 18 (23 Apr 2018 08:20) (18 - 18)  SpO2: 96% (23 Apr 2018 08:20) (95% - 100%)  IVF: [ ] IVL [X ] NS+K@75   DIET: [X ] Regular [ ] CCD [ ] Renal [ ] Puree [ ] Dysphagia [ ] Tube Feeds:   PCA: [ ] YES [X ] NO   SMALLWOOD: [ ] YES [ X] NO [X ] VOID   BM: [ ] YES [ X] NO     DRAINS: N/A    PHYSICAL EXAM:    General: No Acute Distress     Neurological: Awake, alert oriented to person, place and time, Following Commands, PERRLA-4mm OU, EOMI, Face Symmetrical, Speech Fluent, Moving all extremities, Muscle Strength normal in all four extremities, No Drift, Sensation to Light Touch Intact    Pulmonary: Clear to Auscultation, No Rales, No Rhonchi, No Wheezes     Cardiovascular: S1, S2, Regular Rate and Rhythm     Gastrointestinal: Soft, Nontender, Nondistended     Incision: N/A    LABS:                        14.5   9.5   )-----------( 163      ( 22 Apr 2018 03:07 )             43.6    04-22    144  |  104  |  12  ----------------------------<  96  4.0   |  22  |  1.27    Ca    9.5      22 Apr 2018 03:07  Phos  2.2     04-23  Mg     2.3     04-23    TPro  7.4  /  Alb  4.1  /  TBili  0.7  /  DBili  0.2  /  AST  14  /  ALT  18  /  AlkPhos  68  04-22    PT/INR - ( 22 Apr 2018 03:07 )   PT: 12.1 sec;   INR: 1.12 ratio    PTT - ( 22 Apr 2018 03:07 )  PTT:30.8 sec      04-22 @ 07:01  -  04-23 @ 07:00  --------------------------------------------------------  IN: 1380 mL / OUT: 200 mL / NET: 1180 mL    IMAGING: < from: CT Head No Cont (04.22.18 @ 10:28) >  Unchanged loculated acute on chronic subdural hematoma.    < from: CT Angio Head w/ IV Cont (04.22.18 @ 04:54) >  Impression: Chronic left frontal-parietal subdural hematoma with mass   effect exerted on the subjacent brain, described. There are acute   components of the subdural hematoma characterized by higher density. CTA   of the head demonstrates no abnormalities.    < from: Xray Chest 1 View- PORTABLE-Urgent (04.22.18 @ 03:10) >  Clear lungs.    MEDICATIONS  (STANDING):  influenza   Vaccine 0.5 milliLiter(s) IntraMuscular once  levETIRAcetam 1000 milliGRAM(s) Oral two times a day  sodium chloride 0.9% with potassium chloride 20 mEq/L 1000 milliLiter(s) (75 mL/Hr) IV Continuous <Continuous>    MEDICATIONS  (PRN):  acetaminophen   Tablet. 325 milliGRAM(s) Oral every 4 hours PRN Mild Pain (1 - 3)  LORazepam   Injectable 2 milliGRAM(s) IV Push every 2 hours PRN CIWA-Ar score increase by 2 points and a total score of 7 or less

## 2018-04-23 NOTE — CONSULT NOTE ADULT - PROBLEM SELECTOR RECOMMENDATION 3
Low CIWA scores - c/w symptom triggered Ativan Low CIWA scores - c/w symptom triggered Ativan, agree with thiamine - can change to Wernicke treatment dose while awaiting surgical planning - 500Q8 IV x 3d

## 2018-04-23 NOTE — CONSULT NOTE ADULT - ASSESSMENT
62M w/ETOH and cocaine abuse, HTN a/w seizure found to have acute on chronic SDH planned for drainage

## 2018-04-23 NOTE — CONSULT NOTE ADULT - PROBLEM SELECTOR RECOMMENDATION 2
If BP elevated can add Ca channel blocker Norvasc 5mg - although concerns about beta blockade should be moot now that cocaine is out of system.

## 2018-04-23 NOTE — PROGRESS NOTE ADULT - ASSESSMENT
This is a 62 year old male tx from [facility] history of cocaine use / ETOH abuse / marijuana use presented initially with seizures. Was found shaking, drooling with eyes open, unable to speak for 1 hour. Had similar episode as per friend prior in apartment prior to calling 911. Was given keppra / ativan with effect. CTH was completed at outside hospital with suggestion of chronic hematoma vs left extraxial mass. There is 2 mm of shift. On presentation at [facility] he is GCS 15. Severely dysarthric. Denies HA, cervical pain, blurry vision, nausea or vomiting. Hypertensive 203/115 in ED, was given labetalol with effect. Afebrile. No leukocytosis.     PROCEDURE:  Adm 4/22 Tx from [facility] after GTC seizure and found to have acute on chronic Lt SDH  POD#N/A    PLAN:  Neuro: EEG-P. Cont CIWA-Ativan PRN. Cont Keppra. Cont Hold AC.  IVL now. Supp hypophostemia. OOB w/asist. Preop for OR poss Thursday for drainage SDH.    Hosp/Med Cons called at this time for OR clearance FU.    Respiratory: Patient instructed to use incentive spirometer [ ] YES [X ] NO                 DVT ppx: [ ] SQL-hold [ ] SQH and Venodynes [ ] Left [ ] Right [ X] Bilateral    Discharge planning: PT eval-P.

## 2018-04-24 DIAGNOSIS — E87.6 HYPOKALEMIA: ICD-10-CM

## 2018-04-24 LAB
ANION GAP SERPL CALC-SCNC: 11 MMOL/L — SIGNIFICANT CHANGE UP (ref 5–17)
APPEARANCE UR: CLEAR — SIGNIFICANT CHANGE UP
BACTERIA # UR AUTO: NEGATIVE — SIGNIFICANT CHANGE UP
BILIRUB UR-MCNC: NEGATIVE — SIGNIFICANT CHANGE UP
BUN SERPL-MCNC: 15 MG/DL — SIGNIFICANT CHANGE UP (ref 7–23)
CALCIUM SERPL-MCNC: 9.1 MG/DL — SIGNIFICANT CHANGE UP (ref 8.4–10.5)
CHLORIDE SERPL-SCNC: 106 MMOL/L — SIGNIFICANT CHANGE UP (ref 96–108)
CHOLEST SERPL-MCNC: 147 MG/DL — SIGNIFICANT CHANGE UP (ref 10–199)
CO2 SERPL-SCNC: 24 MMOL/L — SIGNIFICANT CHANGE UP (ref 22–31)
COLOR SPEC: YELLOW — SIGNIFICANT CHANGE UP
CREAT ?TM UR-MCNC: 84 MG/DL — SIGNIFICANT CHANGE UP
CREAT SERPL-MCNC: 1.43 MG/DL — HIGH (ref 0.5–1.3)
DIFF PNL FLD: NEGATIVE — SIGNIFICANT CHANGE UP
EPI CELLS # UR: 1 /HPF — SIGNIFICANT CHANGE UP (ref 0–5)
GLUCOSE SERPL-MCNC: 91 MG/DL — SIGNIFICANT CHANGE UP (ref 70–99)
GLUCOSE UR QL: NEGATIVE MG/DL — SIGNIFICANT CHANGE UP
HBA1C BLD-MCNC: 5.6 % — SIGNIFICANT CHANGE UP (ref 4–5.6)
HCT VFR BLD CALC: 42.7 % — SIGNIFICANT CHANGE UP (ref 39–50)
HDLC SERPL-MCNC: 47 MG/DL — SIGNIFICANT CHANGE UP (ref 40–125)
HGB BLD-MCNC: 14.4 G/DL — SIGNIFICANT CHANGE UP (ref 13–17)
KETONES UR-MCNC: NEGATIVE — SIGNIFICANT CHANGE UP
LEUKOCYTE ESTERASE UR-ACNC: NEGATIVE — SIGNIFICANT CHANGE UP
LIPID PNL WITH DIRECT LDL SERPL: 83 MG/DL — SIGNIFICANT CHANGE UP
MCHC RBC-ENTMCNC: 28.8 PG — SIGNIFICANT CHANGE UP (ref 27–34)
MCHC RBC-ENTMCNC: 33.7 GM/DL — SIGNIFICANT CHANGE UP (ref 32–36)
MCV RBC AUTO: 85.4 FL — SIGNIFICANT CHANGE UP (ref 80–100)
NITRITE UR-MCNC: NEGATIVE — SIGNIFICANT CHANGE UP
PH UR: 7 — SIGNIFICANT CHANGE UP (ref 5–8)
PHOSPHATE SERPL-MCNC: 2.8 MG/DL — SIGNIFICANT CHANGE UP (ref 2.5–4.5)
PLATELET # BLD AUTO: 165 K/UL — SIGNIFICANT CHANGE UP (ref 150–400)
POTASSIUM SERPL-MCNC: 3.8 MMOL/L — SIGNIFICANT CHANGE UP (ref 3.5–5.3)
POTASSIUM SERPL-SCNC: 3.8 MMOL/L — SIGNIFICANT CHANGE UP (ref 3.5–5.3)
PROT ?TM UR-MCNC: 6 MG/DL — SIGNIFICANT CHANGE UP (ref 0–12)
PROT UR-MCNC: NEGATIVE MG/DL — SIGNIFICANT CHANGE UP
PROT/CREAT UR-RTO: 0.1 RATIO — SIGNIFICANT CHANGE UP (ref 0–0.2)
RBC # BLD: 5 M/UL — SIGNIFICANT CHANGE UP (ref 4.2–5.8)
RBC # FLD: 14.1 % — SIGNIFICANT CHANGE UP (ref 10.3–14.5)
RBC CASTS # UR COMP ASSIST: 2 /HPF — SIGNIFICANT CHANGE UP (ref 0–4)
SODIUM SERPL-SCNC: 141 MMOL/L — SIGNIFICANT CHANGE UP (ref 135–145)
SP GR SPEC: 1.02 — SIGNIFICANT CHANGE UP (ref 1.01–1.02)
TOTAL CHOLESTEROL/HDL RATIO MEASUREMENT: 3.1 RATIO — LOW (ref 3.4–9.6)
TRIGL SERPL-MCNC: 83 MG/DL — SIGNIFICANT CHANGE UP (ref 10–149)
UROBILINOGEN FLD QL: NEGATIVE MG/DL — SIGNIFICANT CHANGE UP
WBC # BLD: 6.07 K/UL — SIGNIFICANT CHANGE UP (ref 3.8–10.5)
WBC # FLD AUTO: 6.07 K/UL — SIGNIFICANT CHANGE UP (ref 3.8–10.5)
WBC UR QL: 3 /HPF — SIGNIFICANT CHANGE UP (ref 0–5)

## 2018-04-24 PROCEDURE — 99233 SBSQ HOSP IP/OBS HIGH 50: CPT

## 2018-04-24 PROCEDURE — 99231 SBSQ HOSP IP/OBS SF/LOW 25: CPT | Mod: 57

## 2018-04-24 RX ORDER — POTASSIUM CHLORIDE 20 MEQ
10 PACKET (EA) ORAL ONCE
Qty: 0 | Refills: 0 | Status: COMPLETED | OUTPATIENT
Start: 2018-04-24 | End: 2018-04-24

## 2018-04-24 RX ADMIN — Medication 105 MILLIGRAM(S): at 23:00

## 2018-04-24 RX ADMIN — Medication 100 MILLIEQUIVALENT(S): at 16:58

## 2018-04-24 RX ADMIN — DEXTROSE MONOHYDRATE, SODIUM CHLORIDE, AND POTASSIUM CHLORIDE 100 MILLILITER(S): 50; .745; 4.5 INJECTION, SOLUTION INTRAVENOUS at 18:21

## 2018-04-24 RX ADMIN — LEVETIRACETAM 1000 MILLIGRAM(S): 250 TABLET, FILM COATED ORAL at 17:09

## 2018-04-24 RX ADMIN — LEVETIRACETAM 1000 MILLIGRAM(S): 250 TABLET, FILM COATED ORAL at 05:25

## 2018-04-24 RX ADMIN — Medication 105 MILLIGRAM(S): at 13:34

## 2018-04-24 RX ADMIN — DEXTROSE MONOHYDRATE, SODIUM CHLORIDE, AND POTASSIUM CHLORIDE 100 MILLILITER(S): 50; .745; 4.5 INJECTION, SOLUTION INTRAVENOUS at 13:36

## 2018-04-24 RX ADMIN — SENNA PLUS 2 TABLET(S): 8.6 TABLET ORAL at 23:00

## 2018-04-24 RX ADMIN — AMLODIPINE BESYLATE 5 MILLIGRAM(S): 2.5 TABLET ORAL at 05:25

## 2018-04-24 RX ADMIN — Medication 105 MILLIGRAM(S): at 05:26

## 2018-04-24 RX ADMIN — Medication 100 MILLIGRAM(S): at 05:25

## 2018-04-24 NOTE — PHYSICAL THERAPY INITIAL EVALUATION ADULT - PERTINENT HX OF CURRENT PROBLEM, REHAB EVAL
61 y/o M transfer from [facility] w/hx of cocaine use/ETOH abuse/marijuana use presented initially w/seizures. Pt found shaking, drooling w/eyes open, unable to speak x1 hour. Similar episode as per friend prior in apt, prompting 911 call. Was given keppra / ativan w/ effect. CTH completed at OSH w/suggestion of chronic hematoma vs left extraxial mass, 2 mm of shift. CONTINUED BELOW

## 2018-04-24 NOTE — PHYSICAL THERAPY INITIAL EVALUATION ADULT - DISCHARGE DISPOSITION, PT EVAL
home/no skilled PT needs/Home with no skilled PT needs, no AD recommendation. Assist of pt's spouse as needed. **Pt cleared for d/c home from PT perspective at this time.

## 2018-04-24 NOTE — PHYSICAL THERAPY INITIAL EVALUATION ADULT - ADDITIONAL COMMENTS
Per SW note, Pt lives in a second floor apartment with girlfriend/ emergency contact Sharee Yefri [phone]. PTA, patient independent with ADLs and ambulation. Patient not , with 1 son who resides in [place]. Patient retired, listed as Self Pay, however patient reports having insurance.

## 2018-04-24 NOTE — PROGRESS NOTE ADULT - SUBJECTIVE AND OBJECTIVE BOX
This is a 62 year old male tx from [facility] history of cocaine use / ETOH abuse / marijuana use presented initially with seizures. Was found shaking, drooling with eyes open, unable to speak for 1 hour. Had similar episode as per friend prior in apartment prior to calling 911. Was given keppra / ativan with effect. CTH was completed at outside hospital with suggestion of chronic hematoma vs left extraxial mass. There is 2 mm of shift. On presentation at [facility] he is GCS 15. Severely dysarthric. Denies HA, cervical pain, blurry vision, nausea or vomiting. Hypertensive 203/115 in ED, was given labetalol with effect. Afebrile. No leukocytosis. Patient was admitted for further treatment    Overnight event: none    Vital Signs Last 24 Hrs  T(C): 36.9 (24 Apr 2018 08:04), Max: 37 (24 Apr 2018 00:05)  T(F): 98.4 (24 Apr 2018 08:04), Max: 98.6 (24 Apr 2018 00:05)  HR: 78 (24 Apr 2018 08:04) (64 - 82)  BP: 155/70 (24 Apr 2018 08:04) (129/80 - 155/70)  BP(mean): --  RR: 18 (24 Apr 2018 08:04) (18 - 18)  SpO2: 96% (24 Apr 2018 08:04) (93% - 99%)                          14.4   6.07  )-----------( 165      ( 24 Apr 2018 08:02 )             42.7    04-24    141  |  106  |  15  ----------------------------<  91  3.8   |  24  |  1.43<H>    Ca    9.1      24 Apr 2018 06:14  Phos  2.8     04-24  Mg     2.3     04-23       Stroke Core Measures      DRAIN OUTPUT:     NEUROIMAGING: CT Angio Head w/ IV Cont   Chronic left frontal-parietal subdural hematoma with mass   effect exerted on the subjacent brain, described. There are acute   components of the subdural hematoma characterized by higher density. CTA   of the head demonstrates no abnormalities.    4/23 EEG: -Epileptiform Findings:  None were present.      PHYSICAL EXAM:    General: No Acute Distress     Neurological: Awake, alert oriented to person, place and time, Following Commands, PERRL, EOMI, Face Symmetrical, Speech mildly dysarthric, Moving all extremities, Muscle Strength normal in all four extremities, No Drift, Sensation to Light Touch Intact    Pulmonary: Clear to Auscultation, No Rales, No Rhonchi, No Wheezes     Cardiovascular: S1, S2, Regular Rate and Rhythm     Gastrointestinal: Soft, Nontender, Nondistended     Incision:     MEDICATIONS:   Antibiotics:    Neuro:  acetaminophen   Tablet. 325 milliGRAM(s) Oral every 4 hours PRN Mild Pain (1 - 3)  levETIRAcetam 1000 milliGRAM(s) Oral two times a day  LORazepam   Injectable 2 milliGRAM(s) IV Push every 2 hours PRN CIWA-Ar score increase by 2 points and a total score of 7 or less    Anticoagulation:    Cardiology:  amLODIPine   Tablet 5 milliGRAM(s) Oral daily    Endo:     Pulm:    GI/:  docusate sodium 100 milliGRAM(s) Oral three times a day  senna 2 Tablet(s) Oral at bedtime    Other:  influenza   Vaccine 0.5 milliLiter(s) IntraMuscular once  multivitamin 1 Tablet(s) Oral daily  sodium chloride 0.9% with potassium chloride 20 mEq/L 1000 milliLiter(s) IV Continuous <Continuous>  thiamine IVPB 500 milliGRAM(s) IV Intermittent every 8 hours This is a 62 year old male tx from [facility] history of cocaine use / ETOH abuse / marijuana use presented initially with seizures. Was found shaking, drooling with eyes open, unable to speak for 1 hour. Had similar episode as per friend prior in apartment prior to calling 911. Was given keppra / ativan with effect. CT Head  was completed at outside hospital with suggestion of chronic hematoma vs left extraxial mass.      Overnight event: none    Vital Signs Last 24 Hrs  T(C): 36.9 (24 Apr 2018 08:04), Max: 37 (24 Apr 2018 00:05)  T(F): 98.4 (24 Apr 2018 08:04), Max: 98.6 (24 Apr 2018 00:05)  HR: 78 (24 Apr 2018 08:04) (64 - 82)  BP: 155/70 (24 Apr 2018 08:04) (129/80 - 155/70)  BP(mean): --  RR: 18 (24 Apr 2018 08:04) (18 - 18)  SpO2: 96% (24 Apr 2018 08:04) (93% - 99%)                          14.4   6.07  )-----------( 165      ( 24 Apr 2018 08:02 )             42.7    04-24    141  |  106  |  15  ----------------------------<  91  3.8   |  24  |  1.43<H>    Ca    9.1      24 Apr 2018 06:14  Phos  2.8     04-24  Mg     2.3     04-23       Stroke Core Measures      DRAIN OUTPUT:     NEUROIMAGING: CT Angio Head w/ IV Cont   Chronic left frontal-parietal subdural hematoma with mass   effect exerted on the subjacent brain, described. There are acute   components of the subdural hematoma characterized by higher density. CTA   of the head demonstrates no abnormalities.    4/23 EEG: -Epileptiform Findings:  None were present.      PHYSICAL EXAM:    General: No Acute Distress     Neurological: Awake, alert oriented to person, place and time, Following Commands, PERRL, EOMI, Face Symmetrical, Speech mildly dysarthric, Moving all extremities, Muscle Strength normal in all four extremities, No Drift, Sensation to Light Touch Intact    Pulmonary: Clear to Auscultation, No Rales, No Rhonchi, No Wheezes     Cardiovascular: S1, S2, Regular Rate and Rhythm     Gastrointestinal: Soft, Nontender, Nondistended     Incision:     MEDICATIONS:   Antibiotics:    Neuro:  acetaminophen   Tablet. 325 milliGRAM(s) Oral every 4 hours PRN Mild Pain (1 - 3)  levETIRAcetam 1000 milliGRAM(s) Oral two times a day  LORazepam   Injectable 2 milliGRAM(s) IV Push every 2 hours PRN CIWA-Ar score increase by 2 points and a total score of 7 or less    Anticoagulation:    Cardiology:  amLODIPine   Tablet 5 milliGRAM(s) Oral daily    Endo:     Pulm:    GI/:  docusate sodium 100 milliGRAM(s) Oral three times a day  senna 2 Tablet(s) Oral at bedtime    Other:  influenza   Vaccine 0.5 milliLiter(s) IntraMuscular once  multivitamin 1 Tablet(s) Oral daily  sodium chloride 0.9% with potassium chloride 20 mEq/L 1000 milliLiter(s) IV Continuous <Continuous>  thiamine IVPB 500 milliGRAM(s) IV Intermittent every 8 hours

## 2018-04-24 NOTE — PROGRESS NOTE ADULT - SUBJECTIVE AND OBJECTIVE BOX
Authored by Dr Sandor Salinas [phone] [id number]    Patient is a 62y old  Male who presents with a chief complaint of seizure (2018 03:09)    SUBJECTIVE / OVERNIGHT EVENTS: pt w/o complaints, no ha, no dysuria CIWA = 0    MEDICATIONS  (STANDING):  amLODIPine   Tablet 5 milliGRAM(s) Oral daily  docusate sodium 100 milliGRAM(s) Oral three times a day  influenza   Vaccine 0.5 milliLiter(s) IntraMuscular once  levETIRAcetam 1000 milliGRAM(s) Oral two times a day  multivitamin 1 Tablet(s) Oral daily  potassium chloride  10 mEq/100 mL IVPB 10 milliEquivalent(s) IV Intermittent once  senna 2 Tablet(s) Oral at bedtime  sodium chloride 0.9% with potassium chloride 20 mEq/L 1000 milliLiter(s) (100 mL/Hr) IV Continuous <Continuous>  thiamine IVPB 500 milliGRAM(s) IV Intermittent every 8 hours    MEDICATIONS  (PRN):  acetaminophen   Tablet. 325 milliGRAM(s) Oral every 4 hours PRN Mild Pain (1 - 3)  LORazepam   Injectable 2 milliGRAM(s) IV Push every 2 hours PRN CIWA-Ar score increase by 2 points and a total score of 7 or less      Vital Signs Last 24 Hrs  T(C): 36.6 (2018 12:15), Max: 37 (2018 00:05)  T(F): 97.9 (2018 12:15), Max: 98.6 (2018 00:05)  HR: 77 (2018 12:15) (64 - 82)  BP: 127/79 (2018 12:15) (127/79 - 155/70)  BP(mean): --  RR: 18 (2018 12:15) (18 - 18)  SpO2: 98% (2018 12:15) (93% - 99%)  CAPILLARY BLOOD GLUCOSE    I&O's Summary    2018 07:01  -  2018 07:00  --------------------------------------------------------  IN: 1280 mL / OUT: 1300 mL / NET: -20 mL        PHYSICAL EXAM  GENERAL: NAD, well-developed  EYES: conjunctiva and sclera clear  NECK: Supple, No JVD  ENT: MMM  CHEST/LUNG: Clear to auscultation bilaterally; No wheeze  HEART: Regular rate and rhythm; No murmurs  ABDOMEN: Soft, Nontender, Nondistended; Bowel sounds present  EXTREMITIES:  2+ Peripheral Pulses, No clubbing, cyanosis, or edema  NEURO: nonfocal, AOx3  PSYCH: calm   SKIN: No rashes or lesions    LABS:                        14.4   6.07  )-----------( 165      ( 2018 08:02 )             42.7     04-24    141  |  106  |  15  ----------------------------<  91  3.8   |  24  |  1.43<H>    Ca    9.1      2018 06:14  Phos  2.8     04-24  Mg     2.3     04-23    Hemoglobin A1C, Whole Blood: 5.6% (18 @ 08:02)  Lipid Profile in AM (18 @ 07:54)    Total Cholesterol/HDL Ratio Measurement: 3.1 RATIO    Cholesterol, Serum: 147 mg/dL    Triglycerides, Serum: 83 mg/dL    HDL Cholesterol, Serum: 47 mg/dL    Direct LDL: 83    Urinalysis Basic - ( 2018 08:02 )    Color: Yellow / Appearance: Clear / S.016 / pH: x  Gluc: x / Ketone: Negative  / Bili: Negative / Urobili: Negative mg/dL   Blood: x / Protein: Negative mg/dL / Nitrite: Negative   Leuk Esterase: Negative / RBC: 2 /HPF / WBC 3 /HPF   Sq Epi: x / Non Sq Epi: 1 /HPF / Bacteria: Negative      RADIOLOGY & ADDITIONAL TESTS:    Imaging Personally Reviewed:  Consultant(s) Notes Reviewed:    Care Discussed with Consultants/Other Providers:

## 2018-04-24 NOTE — PHYSICAL THERAPY INITIAL EVALUATION ADULT - PRECAUTIONS/LIMITATIONS, REHAB EVAL
Hosp course continued from above: On presentation at [facility] GCS 15, Severely dysarthric. Denies HA/cervical pain/blurry vision/nausea/vomiting. Hypertensive 203/115 in ED, was given labetalol w/effect. Afebrile. No leukocytosis. 4.22.18 CT Head Impression: Chronic left frontal-parietal subdural hematoma with mass effect exerted on the subjacent brain, described. There are acute components of the subdural hematoma characterized by higher density. CTA of the head demonstrates no abnormalities. CTH demonstrates L extra axial collection, likely subdural. Preop for OR for drainage SDH

## 2018-04-24 NOTE — PROGRESS NOTE ADULT - PROBLEM SELECTOR PLAN 1
Pre-op for SDH evacuation today  Continue Keppra for seizure prophylaxis Pre-op for SDH evacuation on thursday  Continue Keppra for seizure prophylaxis Pre-op for SDH evacuation on Thursday  Continue Keppra for seizure prophylaxis

## 2018-04-25 ENCOUNTER — TRANSCRIPTION ENCOUNTER (OUTPATIENT)
Age: 63
End: 2018-04-25

## 2018-04-25 PROBLEM — Z00.00 ENCOUNTER FOR PREVENTIVE HEALTH EXAMINATION: Status: ACTIVE | Noted: 2018-04-25

## 2018-04-25 PROBLEM — I10 ESSENTIAL (PRIMARY) HYPERTENSION: Chronic | Status: ACTIVE | Noted: 2018-04-22

## 2018-04-25 LAB
ANION GAP SERPL CALC-SCNC: 12 MMOL/L — SIGNIFICANT CHANGE UP (ref 5–17)
BUN SERPL-MCNC: 12 MG/DL — SIGNIFICANT CHANGE UP (ref 7–23)
CALCIUM SERPL-MCNC: 9.4 MG/DL — SIGNIFICANT CHANGE UP (ref 8.4–10.5)
CHLORIDE SERPL-SCNC: 105 MMOL/L — SIGNIFICANT CHANGE UP (ref 96–108)
CO2 SERPL-SCNC: 23 MMOL/L — SIGNIFICANT CHANGE UP (ref 22–31)
CREAT SERPL-MCNC: 1.35 MG/DL — HIGH (ref 0.5–1.3)
GLUCOSE SERPL-MCNC: 99 MG/DL — SIGNIFICANT CHANGE UP (ref 70–99)
POTASSIUM SERPL-MCNC: 4.1 MMOL/L — SIGNIFICANT CHANGE UP (ref 3.5–5.3)
POTASSIUM SERPL-SCNC: 4.1 MMOL/L — SIGNIFICANT CHANGE UP (ref 3.5–5.3)
SODIUM SERPL-SCNC: 140 MMOL/L — SIGNIFICANT CHANGE UP (ref 135–145)

## 2018-04-25 PROCEDURE — 99232 SBSQ HOSP IP/OBS MODERATE 35: CPT

## 2018-04-25 PROCEDURE — 99232 SBSQ HOSP IP/OBS MODERATE 35: CPT | Mod: 57

## 2018-04-25 RX ADMIN — AMLODIPINE BESYLATE 5 MILLIGRAM(S): 2.5 TABLET ORAL at 05:49

## 2018-04-25 RX ADMIN — Medication 105 MILLIGRAM(S): at 22:51

## 2018-04-25 RX ADMIN — Medication 105 MILLIGRAM(S): at 05:54

## 2018-04-25 RX ADMIN — LEVETIRACETAM 1000 MILLIGRAM(S): 250 TABLET, FILM COATED ORAL at 17:50

## 2018-04-25 RX ADMIN — DEXTROSE MONOHYDRATE, SODIUM CHLORIDE, AND POTASSIUM CHLORIDE 100 MILLILITER(S): 50; .745; 4.5 INJECTION, SOLUTION INTRAVENOUS at 11:10

## 2018-04-25 RX ADMIN — Medication 105 MILLIGRAM(S): at 13:53

## 2018-04-25 RX ADMIN — Medication 1 TABLET(S): at 11:08

## 2018-04-25 RX ADMIN — Medication 100 MILLIGRAM(S): at 22:52

## 2018-04-25 RX ADMIN — Medication 100 MILLIGRAM(S): at 05:49

## 2018-04-25 RX ADMIN — Medication 100 MILLIGRAM(S): at 05:54

## 2018-04-25 RX ADMIN — SENNA PLUS 2 TABLET(S): 8.6 TABLET ORAL at 22:52

## 2018-04-25 RX ADMIN — Medication 100 MILLIGRAM(S): at 13:52

## 2018-04-25 RX ADMIN — DEXTROSE MONOHYDRATE, SODIUM CHLORIDE, AND POTASSIUM CHLORIDE 100 MILLILITER(S): 50; .745; 4.5 INJECTION, SOLUTION INTRAVENOUS at 22:51

## 2018-04-25 RX ADMIN — LEVETIRACETAM 1000 MILLIGRAM(S): 250 TABLET, FILM COATED ORAL at 05:49

## 2018-04-25 NOTE — PROGRESS NOTE ADULT - SUBJECTIVE AND OBJECTIVE BOX
Patient is a 63 y/o male admitted on 4/22 with Left chronic subdural hematoma    Overnight event, speech is still mildly slurred    Vital Signs Last 24 Hrs  T(C): 36.7 (25 Apr 2018 07:33), Max: 36.9 (25 Apr 2018 05:18)  T(F): 98.1 (25 Apr 2018 07:33), Max: 98.4 (25 Apr 2018 05:18)  HR: 69 (25 Apr 2018 07:33) (68 - 77)  BP: 139/66 (25 Apr 2018 07:33) (127/79 - 149/84)  BP(mean): --  RR: 18 (25 Apr 2018 07:33) (18 - 18)  SpO2: 98% (25 Apr 2018 07:33) (94% - 98%)                          14.4   6.07  )-----------( 165      ( 24 Apr 2018 08:02 )             42.7    04-25    140  |  105  |  12  ----------------------------<  99  4.1   |  23  |  1.35<H>    Ca    9.4      25 Apr 2018 07:05  Phos  2.8     04-24       Stroke Core Measures    Hemoglobin A1C, Whole Blood: 5.6 % (04-24 @ 08:02)    DRAIN OUTPUT:     NEUROIMAGING:     PHYSICAL EXAM:    General: No Acute Distress     Neurological: Awake, alert oriented to person, place and time, Following Commands, PERRL, EOMI, Face Symmetrical, Speech mildly slurred, Moving all extremities, Muscle Strength normal in all four extremities, No Drift, Sensation to Light Touch Intact    Pulmonary: Clear to Auscultation, No Rales, No Rhonchi, No Wheezes     Cardiovascular: S1, S2, Regular Rate and Rhythm     Gastrointestinal: Soft, Nontender, Nondistended     Incision:     MEDICATIONS:   Antibiotics:    Neuro:  acetaminophen   Tablet. 325 milliGRAM(s) Oral every 4 hours PRN Mild Pain (1 - 3)  levETIRAcetam 1000 milliGRAM(s) Oral two times a day  LORazepam   Injectable 2 milliGRAM(s) IV Push every 2 hours PRN CIWA-Ar score increase by 2 points and a total score of 7 or less    Anticoagulation:    Cardiology:  amLODIPine   Tablet 5 milliGRAM(s) Oral daily    Endo:     Pulm:    GI/:  docusate sodium 100 milliGRAM(s) Oral three times a day  senna 2 Tablet(s) Oral at bedtime    Other:  influenza   Vaccine 0.5 milliLiter(s) IntraMuscular once  multivitamin 1 Tablet(s) Oral daily  sodium chloride 0.9% with potassium chloride 20 mEq/L 1000 milliLiter(s) IV Continuous <Continuous>  thiamine IVPB 500 milliGRAM(s) IV Intermittent every 8 hours

## 2018-04-25 NOTE — PROGRESS NOTE ADULT - SUBJECTIVE AND OBJECTIVE BOX
Authored by Dr Sandor Salinas [phone] / [id number]    Patient is a 62y old  Male who presents with a chief complaint of seizure (2018 03:09)    SUBJECTIVE / OVERNIGHT EVENTS:     MEDICATIONS  (STANDING):  amLODIPine   Tablet 5 milliGRAM(s) Oral daily  docusate sodium 100 milliGRAM(s) Oral three times a day  influenza   Vaccine 0.5 milliLiter(s) IntraMuscular once  levETIRAcetam 1000 milliGRAM(s) Oral two times a day  multivitamin 1 Tablet(s) Oral daily  senna 2 Tablet(s) Oral at bedtime  sodium chloride 0.9% with potassium chloride 20 mEq/L 1000 milliLiter(s) (100 mL/Hr) IV Continuous <Continuous>  thiamine IVPB 500 milliGRAM(s) IV Intermittent every 8 hours    MEDICATIONS  (PRN):  acetaminophen   Tablet. 325 milliGRAM(s) Oral every 4 hours PRN Mild Pain (1 - 3)  LORazepam   Injectable 2 milliGRAM(s) IV Push every 2 hours PRN CIWA-Ar score increase by 2 points and a total score of 7 or less      Vital Signs Last 24 Hrs  T(C): 36.7 (2018 07:33), Max: 36.9 (2018 05:18)  T(F): 98.1 (2018 07:33), Max: 98.4 (2018 05:18)  HR: 69 (2018 07:33) (68 - 77)  BP: 139/66 (2018 07:33) (127/79 - 149/84)  BP(mean): --  RR: 18 (2018 07:33) (18 - 18)  SpO2: 98% (2018 07:33) (94% - 98%)  CAPILLARY BLOOD GLUCOSE        I&O's Summary    2018 07:01  -  2018 07:00  --------------------------------------------------------  IN: 0 mL / OUT: 1000 mL / NET: -1000 mL        PHYSICAL EXAM  GENERAL: NAD, well-developed  EYES: conjunctiva and sclera clear  NECK: Supple, No JVD  ENT: MMM  CHEST/LUNG: Clear to auscultation bilaterally; No wheeze  HEART: Regular rate and rhythm; No murmurs  ABDOMEN: Soft, Nontender, Nondistended; Bowel sounds present  EXTREMITIES:  2+ Peripheral Pulses, No clubbing, cyanosis, or edema  NEURO: nonfocal, AOx3  PSYCH: calm   SKIN: No rashes or lesions    LABS:                        14.4   6.07  )-----------( 165      ( 2018 08:02 )             42.7     04-25    140  |  105  |  12  ----------------------------<  99  4.1   |  23  |  1.35<H>    Ca    9.4      2018 07:05  Phos  2.8     04-24            Urinalysis Basic - ( 2018 08:02 )    Color: Yellow / Appearance: Clear / S.016 / pH: x  Gluc: x / Ketone: Negative  / Bili: Negative / Urobili: Negative mg/dL   Blood: x / Protein: Negative mg/dL / Nitrite: Negative   Leuk Esterase: Negative / RBC: 2 /HPF / WBC 3 /HPF   Sq Epi: x / Non Sq Epi: 1 /HPF / Bacteria: Negative          RADIOLOGY & ADDITIONAL TESTS:    Imaging Personally Reviewed:  Consultant(s) Notes Reviewed:    Care Discussed with Consultants/Other Providers: Authored by Dr Sandor Salinas [phone] / [id number]    Patient is a 62y old  Male who presents with a chief complaint of seizure (2018 03:09)    SUBJECTIVE / OVERNIGHT EVENTS: CIWA scores have been zero    MEDICATIONS  (STANDING):  amLODIPine   Tablet 5 milliGRAM(s) Oral daily  docusate sodium 100 milliGRAM(s) Oral three times a day  influenza   Vaccine 0.5 milliLiter(s) IntraMuscular once  levETIRAcetam 1000 milliGRAM(s) Oral two times a day  multivitamin 1 Tablet(s) Oral daily  senna 2 Tablet(s) Oral at bedtime  sodium chloride 0.9% with potassium chloride 20 mEq/L 1000 milliLiter(s) (100 mL/Hr) IV Continuous <Continuous>  thiamine IVPB 500 milliGRAM(s) IV Intermittent every 8 hours    MEDICATIONS  (PRN):  acetaminophen   Tablet. 325 milliGRAM(s) Oral every 4 hours PRN Mild Pain (1 - 3)  LORazepam   Injectable 2 milliGRAM(s) IV Push every 2 hours PRN CIWA-Ar score increase by 2 points and a total score of 7 or less      Vital Signs Last 24 Hrs  T(C): 36.7 (2018 07:33), Max: 36.9 (2018 05:18)  T(F): 98.1 (2018 07:33), Max: 98.4 (2018 05:18)  HR: 69 (2018 07:33) (68 - 77)  BP: 139/66 (2018 07:33) (127/79 - 149/84)  BP(mean): --  RR: 18 (2018 07:33) (18 - 18)  SpO2: 98% (2018 07:33) (94% - 98%)  CAPILLARY BLOOD GLUCOSE        I&O's Summary    2018 07:01  -  2018 07:00  --------------------------------------------------------  IN: 0 mL / OUT: 1000 mL / NET: -1000 mL        PHYSICAL EXAM  GENERAL: NAD, well-developed  EYES: conjunctiva and sclera clear  NECK: Supple, No JVD  ENT: MMM  CHEST/LUNG: Clear to auscultation bilaterally; No wheeze  HEART: Regular rate and rhythm; No murmurs  ABDOMEN: Soft, Nontender, Nondistended; Bowel sounds present  EXTREMITIES:  2+ Peripheral Pulses, No clubbing, cyanosis, or edema  NEURO: nonfocal, AOx3  PSYCH: calm   SKIN: No rashes or lesions    LABS:                        14.4   6.07  )-----------( 165      ( 2018 08:02 )             42.7     04-    140  |  105  |  12  ----------------------------<  99  4.1   |  23  |  1.35<H>    Ca    9.4      2018 07:05  Phos  2.8     04-24      Urinalysis Basic - ( 2018 08:02 )    Color: Yellow / Appearance: Clear / S.016 / pH: x  Gluc: x / Ketone: Negative  / Bili: Negative / Urobili: Negative mg/dL   Blood: x / Protein: Negative mg/dL / Nitrite: Negative   Leuk Esterase: Negative / RBC: 2 /HPF / WBC 3 /HPF   Sq Epi: x / Non Sq Epi: 1 /HPF / Bacteria: Negative          RADIOLOGY & ADDITIONAL TESTS:    Imaging Personally Reviewed:  Consultant(s) Notes Reviewed:    Care Discussed with Consultants/Other Providers: GEMMA ardon surgical planning

## 2018-04-26 ENCOUNTER — APPOINTMENT (OUTPATIENT)
Dept: NEUROSURGERY | Facility: HOSPITAL | Age: 63
End: 2018-04-26

## 2018-04-26 ENCOUNTER — RESULT REVIEW (OUTPATIENT)
Age: 63
End: 2018-04-26

## 2018-04-26 LAB
ANION GAP SERPL CALC-SCNC: 15 MMOL/L — SIGNIFICANT CHANGE UP (ref 5–17)
BLD GP AB SCN SERPL QL: NEGATIVE — SIGNIFICANT CHANGE UP
BUN SERPL-MCNC: 13 MG/DL — SIGNIFICANT CHANGE UP (ref 7–23)
CALCIUM SERPL-MCNC: 9.7 MG/DL — SIGNIFICANT CHANGE UP (ref 8.4–10.5)
CHLORIDE SERPL-SCNC: 102 MMOL/L — SIGNIFICANT CHANGE UP (ref 96–108)
CO2 SERPL-SCNC: 23 MMOL/L — SIGNIFICANT CHANGE UP (ref 22–31)
CREAT SERPL-MCNC: 1.48 MG/DL — HIGH (ref 0.5–1.3)
GLUCOSE SERPL-MCNC: 194 MG/DL — HIGH (ref 70–99)
HCT VFR BLD CALC: 46.3 % — SIGNIFICANT CHANGE UP (ref 39–50)
HGB BLD-MCNC: 15.5 G/DL — SIGNIFICANT CHANGE UP (ref 13–17)
MAGNESIUM SERPL-MCNC: 2.2 MG/DL — SIGNIFICANT CHANGE UP (ref 1.6–2.6)
MCHC RBC-ENTMCNC: 29.9 PG — SIGNIFICANT CHANGE UP (ref 27–34)
MCHC RBC-ENTMCNC: 33.5 GM/DL — SIGNIFICANT CHANGE UP (ref 32–36)
MCV RBC AUTO: 89.4 FL — SIGNIFICANT CHANGE UP (ref 80–100)
PHOSPHATE SERPL-MCNC: 3.4 MG/DL — SIGNIFICANT CHANGE UP (ref 2.5–4.5)
PLATELET # BLD AUTO: 185 K/UL — SIGNIFICANT CHANGE UP (ref 150–400)
POTASSIUM SERPL-MCNC: 4.5 MMOL/L — SIGNIFICANT CHANGE UP (ref 3.5–5.3)
POTASSIUM SERPL-SCNC: 4.5 MMOL/L — SIGNIFICANT CHANGE UP (ref 3.5–5.3)
RBC # BLD: 5.18 M/UL — SIGNIFICANT CHANGE UP (ref 4.2–5.8)
RBC # FLD: 12.4 % — SIGNIFICANT CHANGE UP (ref 10.3–14.5)
RH IG SCN BLD-IMP: NEGATIVE — SIGNIFICANT CHANGE UP
SODIUM SERPL-SCNC: 140 MMOL/L — SIGNIFICANT CHANGE UP (ref 135–145)
WBC # BLD: 5.7 K/UL — SIGNIFICANT CHANGE UP (ref 3.8–10.5)
WBC # FLD AUTO: 5.7 K/UL — SIGNIFICANT CHANGE UP (ref 3.8–10.5)

## 2018-04-26 PROCEDURE — 61312 CRNEC/CRNOT STTL XDRL/SDRL: CPT

## 2018-04-26 PROCEDURE — 99232 SBSQ HOSP IP/OBS MODERATE 35: CPT

## 2018-04-26 PROCEDURE — 88307 TISSUE EXAM BY PATHOLOGIST: CPT | Mod: 26

## 2018-04-26 RX ORDER — THIAMINE MONONITRATE (VIT B1) 100 MG
500 TABLET ORAL DAILY
Qty: 0 | Refills: 0 | Status: DISCONTINUED | OUTPATIENT
Start: 2018-04-26 | End: 2018-04-29

## 2018-04-26 RX ORDER — AMLODIPINE BESYLATE 2.5 MG/1
5 TABLET ORAL DAILY
Qty: 0 | Refills: 0 | Status: DISCONTINUED | OUTPATIENT
Start: 2018-04-26 | End: 2018-04-29

## 2018-04-26 RX ORDER — SENNA PLUS 8.6 MG/1
2 TABLET ORAL AT BEDTIME
Qty: 0 | Refills: 0 | Status: DISCONTINUED | OUTPATIENT
Start: 2018-04-26 | End: 2018-04-29

## 2018-04-26 RX ORDER — ACETAMINOPHEN 500 MG
325 TABLET ORAL EVERY 4 HOURS
Qty: 0 | Refills: 0 | Status: DISCONTINUED | OUTPATIENT
Start: 2018-04-26 | End: 2018-04-29

## 2018-04-26 RX ORDER — THIAMINE MONONITRATE (VIT B1) 100 MG
500 TABLET ORAL EVERY 8 HOURS
Qty: 0 | Refills: 0 | Status: DISCONTINUED | OUTPATIENT
Start: 2018-04-26 | End: 2018-04-26

## 2018-04-26 RX ORDER — FOLIC ACID 0.8 MG
1 TABLET ORAL DAILY
Qty: 0 | Refills: 0 | Status: DISCONTINUED | OUTPATIENT
Start: 2018-04-26 | End: 2018-04-29

## 2018-04-26 RX ORDER — DOCUSATE SODIUM 100 MG
100 CAPSULE ORAL THREE TIMES A DAY
Qty: 0 | Refills: 0 | Status: DISCONTINUED | OUTPATIENT
Start: 2018-04-26 | End: 2018-04-29

## 2018-04-26 RX ORDER — DEXTROSE MONOHYDRATE, SODIUM CHLORIDE, AND POTASSIUM CHLORIDE 50; .745; 4.5 G/1000ML; G/1000ML; G/1000ML
1000 INJECTION, SOLUTION INTRAVENOUS
Qty: 0 | Refills: 0 | Status: DISCONTINUED | OUTPATIENT
Start: 2018-04-26 | End: 2018-04-28

## 2018-04-26 RX ORDER — DEXTROSE MONOHYDRATE, SODIUM CHLORIDE, AND POTASSIUM CHLORIDE 50; .745; 4.5 G/1000ML; G/1000ML; G/1000ML
1000 INJECTION, SOLUTION INTRAVENOUS
Qty: 0 | Refills: 0 | Status: DISCONTINUED | OUTPATIENT
Start: 2018-04-26 | End: 2018-04-26

## 2018-04-26 RX ORDER — LEVETIRACETAM 250 MG/1
1000 TABLET, FILM COATED ORAL
Qty: 0 | Refills: 0 | Status: DISCONTINUED | OUTPATIENT
Start: 2018-04-26 | End: 2018-04-29

## 2018-04-26 RX ADMIN — Medication 105 MILLIGRAM(S): at 21:22

## 2018-04-26 RX ADMIN — Medication 100 MILLIGRAM(S): at 21:22

## 2018-04-26 RX ADMIN — LEVETIRACETAM 1000 MILLIGRAM(S): 250 TABLET, FILM COATED ORAL at 05:23

## 2018-04-26 RX ADMIN — Medication 105 MILLIGRAM(S): at 05:23

## 2018-04-26 RX ADMIN — LEVETIRACETAM 1000 MILLIGRAM(S): 250 TABLET, FILM COATED ORAL at 18:09

## 2018-04-26 RX ADMIN — AMLODIPINE BESYLATE 5 MILLIGRAM(S): 2.5 TABLET ORAL at 05:23

## 2018-04-26 RX ADMIN — SENNA PLUS 2 TABLET(S): 8.6 TABLET ORAL at 21:22

## 2018-04-26 RX ADMIN — Medication 100 MILLIGRAM(S): at 05:23

## 2018-04-26 NOTE — PROGRESS NOTE ADULT - ASSESSMENT
Summary: s/p craniotomy for lesion of unclear etiology and significance.      NEURO:  q1h neuro checks, keppra 1g bid, CIWA protocol, thiamine, multivitamin w/dextrose, followup biopsy results, f/u CT in AM.    CARDS:  -150    PULM:  sat > 92%, IS    RENAL: NS @100ml/hr, IVL when po intake sufficient     GASTRO:  advance as tolerated  ---> Stress ulcer prophylaxis:  not indicated    HEME:  monitor H/H    ---> DVT prophylaxis: SCDs, hold anticoagulation, fresh post op, start PPX pending AM CT    ENDO:  euglycemia    ID:  afebrile    Code status:  Full code  Disposition:  ICU    This patient was at high risk of neurologic deterioration and/or death due to: seizure, post operative hemorrhage Summary: s/p craniotomy for lesion of unclear etiology and significance.      NEURO:  q1h neuro checks, keppra 1g bid, CIWA protocol, thiamine, multivitamin w/dextrose, followup biopsy results, f/u CT in AM, MRI brain wwo     CARDS:  -150, amlodipine 5 mg     PULM:  sat > 92%, IS    RENAL: NS @ 75 ml/hr, IVL when po intake sufficient     GASTRO:  advance as tolerated  ---> Stress ulcer prophylaxis:  not indicated    HEME:  monitor H/H    ---> DVT prophylaxis: SCDs, hold anticoagulation, fresh post op, start PPX pending AM CT    ENDO:  euglycemia    ID:  afebrile    Code status:  Full code  Disposition:  ICU    This patient was at high risk of neurologic deterioration and/or death due to: seizure, post operative hemorrhage

## 2018-04-26 NOTE — PROGRESS NOTE ADULT - ASSESSMENT
Summary: s/p craniotomy for lesion of unclear etiology and significance.        Plan  Close neuro checks  SBP goal < 140 mm hg.  Keppra foe SZ ppx.

## 2018-04-26 NOTE — PROGRESS NOTE ADULT - SUBJECTIVE AND OBJECTIVE BOX
HPI:  This is a 62 year old male tx from [facility] history of cocaine use / ETOH abuse / marijuana use presented initially with seizures. Was found shaking, drooling with eyes open, unable to speak for 1 hour. Had similar episode as per friend prior in apartment prior to calling 911. Was given keppra / ativan with effect. CTH was completed at outside hospital with suggestion of chronic hematoma vs left extraxial mass. There is 2 mm of shift. On presentation at [facility] he is GCS 15.  Patient     PMHX: none    Meds: none    SH: cocaine abuse, lives in [place]	    CTH: left frontal and high left fronto/parietal extraxial lesions vs SDH measuring 5.8cm and 5.9 cm respectively. (22 Apr 2018 03:09)    VITALS:  T(C): , Max: 37.1 (04-26-18 @ 14:20)  HR:  (57 - 85)  BP:  (131/70 - 156/91)  ABP: --  RR:  (11 - 18)  SpO2:  (97% - 100%)  Wt(kg): --      LABS:  Na: 140 (04-25 @ 07:05), 141 (04-24 @ 06:14)  K: 4.1 (04-25 @ 07:05), 3.8 (04-24 @ 06:14)  Cl: 105 (04-25 @ 07:05), 106 (04-24 @ 06:14)  CO2: 23 (04-25 @ 07:05), 24 (04-24 @ 06:14)  BUN: 12 (04-25 @ 07:05), 15 (04-24 @ 06:14)  Cr: 1.35 (04-25 @ 07:05), 1.43 (04-24 @ 06:14)  Glu:     Hgb: 14.4 (04-24 @ 08:02)  Hct: 42.7 (04-24 @ 08:02)  WBC: 6.07 (04-24 @ 08:02)  Plt: 165 (04-24 @ 08:02)      IMAGING:   Recent imaging studies were reviewed.    MEDICATIONS:  acetaminophen   Tablet. 325 milliGRAM(s) Oral every 4 hours PRN  amLODIPine   Tablet 5 milliGRAM(s) Oral daily  docusate sodium 100 milliGRAM(s) Oral three times a day  influenza   Vaccine 0.5 milliLiter(s) IntraMuscular once  levETIRAcetam 1000 milliGRAM(s) Oral two times a day  LORazepam   Injectable 2 milliGRAM(s) IV Push every 2 hours PRN  multivitamin 1 Tablet(s) Oral daily  senna 2 Tablet(s) Oral at bedtime  sodium chloride 0.9% with potassium chloride 20 mEq/L 1000 milliLiter(s) IV Continuous <Continuous>  thiamine IVPB 500 milliGRAM(s) IV Intermittent every 8 hours    EXAMINATION:  General:  calm  HEENT:  MMM  Neuro:  awake, alert, oriented x 3, follows commands, moves all extremities  Cards:  RRR  Respiratory:  no respiratory distress  Adomen:  soft  Skin:  warm/dry HPI:  This is a 62 year old male tx from [facility] history of cocaine use / ETOH abuse / marijuana use presented initially with seizures. Was found shaking, drooling with eyes open, unable to speak for 1 hour. Had similar episode as per friend prior in apartment prior to calling 911. Was given keppra / ativan with effect. CTH was completed at outside hospital with suggestion of chronic hematoma vs left extraxial mass. There is 2 mm of shift. On presentation at [facility] he is GCS 15.      PMHX: none    Meds: none    SH: cocaine abuse, lives in [place]	    CTH: left frontal and high left fronto/parietal extraxial lesions vs SDH measuring 5.8cm and 5.9 cm respectively. (22 Apr 2018 03:09)  T(C): 37.1 (04-26-18 @ 14:20), Max: 37.1 (04-26-18 @ 14:20)  HR: 57 (04-26-18 @ 15:00) (57 - 85)  BP: 144/89 (04-26-18 @ 15:00) (131/70 - 156/91)  RR: 14 (04-26-18 @ 15:00) (11 - 18)  SpO2: 100% (04-26-18 @ 15:00) (97% - 100%)  04-25-18 @ 07:01  -  04-26-18 @ 07:00  --------------------------------------------------------  IN: 3220 mL / OUT: 1900 mL / NET: 1320 mL    04-26-18 @ 07:01  -  04-26-18 @ 16:50  --------------------------------------------------------  IN: 300 mL / OUT: 500 mL / NET: -200 mL    acetaminophen   Tablet. 325 milliGRAM(s) Oral every 4 hours PRN  amLODIPine   Tablet 5 milliGRAM(s) Oral daily  docusate sodium 100 milliGRAM(s) Oral three times a day  influenza   Vaccine 0.5 milliLiter(s) IntraMuscular once  levETIRAcetam 1000 milliGRAM(s) Oral two times a day  LORazepam   Injectable 2 milliGRAM(s) IV Push every 2 hours PRN  multivitamin 1 Tablet(s) Oral daily  senna 2 Tablet(s) Oral at bedtime  sodium chloride 0.9% with potassium chloride 20 mEq/L 1000 milliLiter(s) IV Continuous <Continuous>  thiamine IVPB 500 milliGRAM(s) IV Intermittent every 8 hours      LABS:  Na: 140 (04-25 @ 07:05), 141 (04-24 @ 06:14)  K: 4.1 (04-25 @ 07:05), 3.8 (04-24 @ 06:14)  Cl: 105 (04-25 @ 07:05), 106 (04-24 @ 06:14)  CO2: 23 (04-25 @ 07:05), 24 (04-24 @ 06:14)  BUN: 12 (04-25 @ 07:05), 15 (04-24 @ 06:14)  Cr: 1.35 (04-25 @ 07:05), 1.43 (04-24 @ 06:14)  Glu:     Hgb: 14.4 (04-24 @ 08:02)  Hct: 42.7 (04-24 @ 08:02)  WBC: 6.07 (04-24 @ 08:02)  Plt: 165 (04-24 @ 08:02)      IMAGING:   Recent imaging studies were reviewed.    EXAMINATION:  General:  calm  HEENT:  MMM  Neuro:  awake, alert, oriented x 3, JOSEE,  follows commands, moves all extremities 5/5, no sensory deficits  Cards:  RRR  Respiratory:  no respiratory distress  Adomen:  soft  Skin:  warm/dry

## 2018-04-26 NOTE — PROGRESS NOTE ADULT - SUBJECTIVE AND OBJECTIVE BOX
HPI:  This is a 62 year old male tx from [facility] history of cocaine use / ETOH abuse / marijuana use presented initially with seizures. Was found shaking, drooling with eyes open, unable to speak for 1 hour. Had similar episode as per friend prior in apartment prior to calling 911. Was given keppra / ativan with effect. CTH was completed at outside hospital with suggestion of chronic hematoma vs left extraxial mass. There is 2 mm of shift. On presentation at [facility] he is GCS 15.      PMHX: none    Meds: none    SH: cocaine abuse, lives in [place]	    CTH: left frontal and high left fronto/parietal extraxial lesions vs SDH measuring 5.8cm and 5.9 cm respectively. (22 Apr 2018 03:09)  T(C): 37.1 (04-26-18 @ 14:20), Max: 37.1 (04-26-18 @ 14:20)  HR: 57 (04-26-18 @ 15:00) (57 - 85)  BP: 144/89 (04-26-18 @ 15:00) (131/70 - 156/91)  RR: 14 (04-26-18 @ 15:00) (11 - 18)  SpO2: 100% (04-26-18 @ 15:00) (97% - 100%)  04-25-18 @ 07:01  -  04-26-18 @ 07:00  --------------------------------------------------------  IN: 3220 mL / OUT: 1900 mL / NET: 1320 mL    04-26-18 @ 07:01  -  04-26-18 @ 16:50  --------------------------------------------------------  IN: 300 mL / OUT: 500 mL / NET: -200 mL    acetaminophen   Tablet. 325 milliGRAM(s) Oral every 4 hours PRN  amLODIPine   Tablet 5 milliGRAM(s) Oral daily  docusate sodium 100 milliGRAM(s) Oral three times a day  influenza   Vaccine 0.5 milliLiter(s) IntraMuscular once  levETIRAcetam 1000 milliGRAM(s) Oral two times a day  LORazepam   Injectable 2 milliGRAM(s) IV Push every 2 hours PRN  multivitamin 1 Tablet(s) Oral daily  senna 2 Tablet(s) Oral at bedtime  sodium chloride 0.9% with potassium chloride 20 mEq/L 1000 milliLiter(s) IV Continuous <Continuous>  thiamine IVPB 500 milliGRAM(s) IV Intermittent every 8 hours      LABS:  Na: 140 (04-25 @ 07:05), 141 (04-24 @ 06:14)  K: 4.1 (04-25 @ 07:05), 3.8 (04-24 @ 06:14)  Cl: 105 (04-25 @ 07:05), 106 (04-24 @ 06:14)  CO2: 23 (04-25 @ 07:05), 24 (04-24 @ 06:14)  BUN: 12 (04-25 @ 07:05), 15 (04-24 @ 06:14)  Cr: 1.35 (04-25 @ 07:05), 1.43 (04-24 @ 06:14)  Glu:     Hgb: 14.4 (04-24 @ 08:02)  Hct: 42.7 (04-24 @ 08:02)  WBC: 6.07 (04-24 @ 08:02)  Plt: 165 (04-24 @ 08:02)      IMAGING:   Recent imaging studies were reviewed.    EXAMINATION:  General:  calm  HEENT:  MMM  Neuro:  awake, alert, oriented x 3, JOSEE,  follows commands, moves all extremities 5/5, no sensory deficits  Cards:  RRR  Respiratory:  no respiratory distress  Adomen:  soft  Skin:  warm/dry

## 2018-04-27 DIAGNOSIS — D49.6 NEOPLASM OF UNSPECIFIED BEHAVIOR OF BRAIN: ICD-10-CM

## 2018-04-27 LAB
CREAT ?TM UR-MCNC: 129 MG/DL — SIGNIFICANT CHANGE UP
GRAM STN FLD: SIGNIFICANT CHANGE UP
HCT VFR BLD CALC: 45.1 % — SIGNIFICANT CHANGE UP (ref 39–50)
HGB BLD-MCNC: 14.9 G/DL — SIGNIFICANT CHANGE UP (ref 13–17)
MCHC RBC-ENTMCNC: 29.7 PG — SIGNIFICANT CHANGE UP (ref 27–34)
MCHC RBC-ENTMCNC: 33 GM/DL — SIGNIFICANT CHANGE UP (ref 32–36)
MCV RBC AUTO: 89.8 FL — SIGNIFICANT CHANGE UP (ref 80–100)
PLATELET # BLD AUTO: 169 K/UL — SIGNIFICANT CHANGE UP (ref 150–400)
RBC # BLD: 5.02 M/UL — SIGNIFICANT CHANGE UP (ref 4.2–5.8)
RBC # FLD: 12.4 % — SIGNIFICANT CHANGE UP (ref 10.3–14.5)
SODIUM UR-SCNC: 141 MMOL/L — SIGNIFICANT CHANGE UP
SPECIMEN SOURCE: SIGNIFICANT CHANGE UP
WBC # BLD: 11.5 K/UL — HIGH (ref 3.8–10.5)
WBC # FLD AUTO: 11.5 K/UL — HIGH (ref 3.8–10.5)

## 2018-04-27 PROCEDURE — 99233 SBSQ HOSP IP/OBS HIGH 50: CPT

## 2018-04-27 PROCEDURE — 71045 X-RAY EXAM CHEST 1 VIEW: CPT | Mod: 26

## 2018-04-27 PROCEDURE — 70450 CT HEAD/BRAIN W/O DYE: CPT | Mod: 26

## 2018-04-27 PROCEDURE — 70553 MRI BRAIN STEM W/O & W/DYE: CPT | Mod: 26

## 2018-04-27 RX ORDER — HYDRALAZINE HCL 50 MG
10 TABLET ORAL ONCE
Qty: 0 | Refills: 0 | Status: COMPLETED | OUTPATIENT
Start: 2018-04-27 | End: 2018-04-27

## 2018-04-27 RX ORDER — SODIUM CHLORIDE 9 MG/ML
500 INJECTION INTRAMUSCULAR; INTRAVENOUS; SUBCUTANEOUS ONCE
Qty: 0 | Refills: 0 | Status: DISCONTINUED | OUTPATIENT
Start: 2018-04-27 | End: 2018-04-28

## 2018-04-27 RX ORDER — HEPARIN SODIUM 5000 [USP'U]/ML
5000 INJECTION INTRAVENOUS; SUBCUTANEOUS EVERY 8 HOURS
Qty: 0 | Refills: 0 | Status: DISCONTINUED | OUTPATIENT
Start: 2018-04-27 | End: 2018-04-29

## 2018-04-27 RX ADMIN — Medication 10 MILLIGRAM(S): at 05:04

## 2018-04-27 RX ADMIN — HEPARIN SODIUM 5000 UNIT(S): 5000 INJECTION INTRAVENOUS; SUBCUTANEOUS at 13:02

## 2018-04-27 RX ADMIN — DEXTROSE MONOHYDRATE, SODIUM CHLORIDE, AND POTASSIUM CHLORIDE 75 MILLILITER(S): 50; .745; 4.5 INJECTION, SOLUTION INTRAVENOUS at 06:35

## 2018-04-27 RX ADMIN — AMLODIPINE BESYLATE 5 MILLIGRAM(S): 2.5 TABLET ORAL at 05:06

## 2018-04-27 RX ADMIN — Medication 100 MILLIGRAM(S): at 05:06

## 2018-04-27 RX ADMIN — Medication 325 MILLIGRAM(S): at 21:22

## 2018-04-27 RX ADMIN — SENNA PLUS 2 TABLET(S): 8.6 TABLET ORAL at 21:33

## 2018-04-27 RX ADMIN — DEXTROSE MONOHYDRATE, SODIUM CHLORIDE, AND POTASSIUM CHLORIDE 75 MILLILITER(S): 50; .745; 4.5 INJECTION, SOLUTION INTRAVENOUS at 20:54

## 2018-04-27 RX ADMIN — Medication 1 MILLIGRAM(S): at 11:13

## 2018-04-27 RX ADMIN — Medication 500 MILLIGRAM(S): at 11:13

## 2018-04-27 RX ADMIN — Medication 1 TABLET(S): at 11:13

## 2018-04-27 RX ADMIN — HEPARIN SODIUM 5000 UNIT(S): 5000 INJECTION INTRAVENOUS; SUBCUTANEOUS at 21:33

## 2018-04-27 RX ADMIN — LEVETIRACETAM 1000 MILLIGRAM(S): 250 TABLET, FILM COATED ORAL at 05:06

## 2018-04-27 RX ADMIN — Medication 100 MILLIGRAM(S): at 13:02

## 2018-04-27 RX ADMIN — Medication 100 MILLIGRAM(S): at 21:33

## 2018-04-27 RX ADMIN — Medication 325 MILLIGRAM(S): at 20:23

## 2018-04-27 RX ADMIN — LEVETIRACETAM 1000 MILLIGRAM(S): 250 TABLET, FILM COATED ORAL at 17:32

## 2018-04-27 NOTE — PROGRESS NOTE ADULT - SUBJECTIVE AND OBJECTIVE BOX
Authored by Dr Sandor Salinas [phone]     Patient is a 62y old  Male who presents with a chief complaint of seizure (22 Apr 2018 03:09)    SUBJECTIVE / OVERNIGHT EVENTS: Pt returns to floor. 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     MEDICATIONS  (STANDING):  amLODIPine   Tablet 5 milliGRAM(s) Oral daily  docusate sodium 100 milliGRAM(s) Oral three times a day  folic acid 1 milliGRAM(s) Oral daily  heparin  Injectable 5000 Unit(s) SubCutaneous every 8 hours  influenza   Vaccine 0.5 milliLiter(s) IntraMuscular once  levETIRAcetam 1000 milliGRAM(s) Oral two times a day  multivitamin 1 Tablet(s) Oral daily  senna 2 Tablet(s) Oral at bedtime  sodium chloride 0.9% Bolus 500 milliLiter(s) IV Bolus once  sodium chloride 0.9% with potassium chloride 20 mEq/L 1000 milliLiter(s) (75 mL/Hr) IV Continuous <Continuous>  thiamine 500 milliGRAM(s) Oral daily    MEDICATIONS  (PRN):  acetaminophen   Tablet. 325 milliGRAM(s) Oral every 4 hours PRN Mild Pain (1 - 3)      Vital Signs Last 24 Hrs  T(C): 36.9 (27 Apr 2018 11:00), Max: 37.4 (27 Apr 2018 07:00)  T(F): 98.4 (27 Apr 2018 11:00), Max: 99.3 (27 Apr 2018 07:00)  HR: 86 (27 Apr 2018 12:00) (52 - 117)  BP: 136/75 (27 Apr 2018 12:00) (133/64 - 166/94)  BP(mean): 94 (27 Apr 2018 12:00) (78 - 120)  RR: 18 (27 Apr 2018 12:00) (14 - 27)  SpO2: 100% (27 Apr 2018 12:00) (94% - 100%)  CAPILLARY BLOOD GLUCOSE        I&O's Summary    26 Apr 2018 07:01  -  27 Apr 2018 07:00  --------------------------------------------------------  IN: 1780 mL / OUT: 2025 mL / NET: -245 mL    27 Apr 2018 07:01  -  27 Apr 2018 14:47  --------------------------------------------------------  IN: 1295 mL / OUT: 620 mL / NET: 675 mL        PHYSICAL EXAM  GENERAL: NAD, well-developed  EYES: conjunctiva and sclera clear  NECK: Supple, No JVD  ENT: MMM  CHEST/LUNG: Clear to auscultation bilaterally; No wheeze  HEART: Regular rate and rhythm; No murmurs  ABDOMEN: Soft, Nontender, Nondistended; Bowel sounds present  EXTREMITIES:  2+ Peripheral Pulses, No clubbing, cyanosis, or edema  NEURO: nonfocal, AOx3  PSYCH: calm   SKIN: No rashes or lesions    LABS:                        15.5   5.7   )-----------( 185      ( 26 Apr 2018 20:53 )             46.3     04-26    140  |  102  |  13  ----------------------------<  194<H>  4.5   |  23  |  1.48<H>    Ca    9.7      26 Apr 2018 20:53  Phos  3.4     04-26  Mg     2.2     04-26        Creatinine, Random Urine: 129: Reference Ranges have NOT been established for random urine analytes due  to variability in fluid intake and concentration. mg/dL (04.27.18 @ 13:34)    Sodium, Random Urine: 141: Reference Ranges have NOT been established for random urine analytes due  to variability in fluid intake and concentration. mmol/L (04.27.18 @ 13:34)    FENa = 1.2%        Culture - Tissue with Gram Stain (collected 26 Apr 2018 21:22)  Source: .Tissue Other, subdural lesion  Gram Stain (27 Apr 2018 02:07):    No polymorphonuclear cells seen per low power field    No organisms seen per oil power field        RADIOLOGY & ADDITIONAL TESTS:    Imaging Personally Reviewed:  Consultant(s) Notes Reviewed:    Care Discussed with Consultants/Other Providers: Authored by Dr Sandor Salinas [phone]     Patient is a 62y old  Male who presents with a chief complaint of seizure (22 Apr 2018 03:09)    SUBJECTIVE / OVERNIGHT EVENTS: Pt returns to floor. 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 Pt has minimal pain at surgical site otherwise no complaints.     MEDICATIONS  (STANDING):  amLODIPine   Tablet 5 milliGRAM(s) Oral daily  docusate sodium 100 milliGRAM(s) Oral three times a day  folic acid 1 milliGRAM(s) Oral daily  heparin  Injectable 5000 Unit(s) SubCutaneous every 8 hours  influenza   Vaccine 0.5 milliLiter(s) IntraMuscular once  levETIRAcetam 1000 milliGRAM(s) Oral two times a day  multivitamin 1 Tablet(s) Oral daily  senna 2 Tablet(s) Oral at bedtime  sodium chloride 0.9% Bolus 500 milliLiter(s) IV Bolus once  sodium chloride 0.9% with potassium chloride 20 mEq/L 1000 milliLiter(s) (75 mL/Hr) IV Continuous <Continuous>  thiamine 500 milliGRAM(s) Oral daily    MEDICATIONS  (PRN):  acetaminophen   Tablet. 325 milliGRAM(s) Oral every 4 hours PRN Mild Pain (1 - 3)      Vital Signs Last 24 Hrs  T(C): 36.9 (27 Apr 2018 11:00), Max: 37.4 (27 Apr 2018 07:00)  T(F): 98.4 (27 Apr 2018 11:00), Max: 99.3 (27 Apr 2018 07:00)  HR: 86 (27 Apr 2018 12:00) (52 - 117)  BP: 136/75 (27 Apr 2018 12:00) (133/64 - 166/94)  BP(mean): 94 (27 Apr 2018 12:00) (78 - 120)  RR: 18 (27 Apr 2018 12:00) (14 - 27)  SpO2: 100% (27 Apr 2018 12:00) (94% - 100%)  CAPILLARY BLOOD GLUCOSE        I&O's Summary    26 Apr 2018 07:01  -  27 Apr 2018 07:00  --------------------------------------------------------  IN: 1780 mL / OUT: 2025 mL / NET: -245 mL    27 Apr 2018 07:01  -  27 Apr 2018 14:47  --------------------------------------------------------  IN: 1295 mL / OUT: 620 mL / NET: 675 mL        PHYSICAL EXAM  GENERAL: NAD, well-developed  EYES: conjunctiva and sclera clear  HEAD: L sided surgical incision some dried blood  NECK: Supple, No JVD  ENT: MMM  CHEST/LUNG: Clear to auscultation bilaterally; No wheeze  HEART: Regular rate and rhythm; No murmurs  ABDOMEN: Soft, Nontender, Nondistended; Bowel sounds present  EXTREMITIES:  2+ Peripheral Pulses, No clubbing, cyanosis, or edema  NEURO: nonfocal, AOx3 no pronator drift  PSYCH: calm   SKIN: No rashes or lesions    LABS:                        15.5   5.7   )-----------( 185      ( 26 Apr 2018 20:53 )             46.3     04-26    140  |  102  |  13  ----------------------------<  194<H>  4.5   |  23  |  1.48<H>    Ca    9.7      26 Apr 2018 20:53  Phos  3.4     04-26  Mg     2.2     04-26        Creatinine, Random Urine: 129: Reference Ranges have NOT been established for random urine analytes due  to variability in fluid intake and concentration. mg/dL (04.27.18 @ 13:34)    Sodium, Random Urine: 141: Reference Ranges have NOT been established for random urine analytes due  to variability in fluid intake and concentration. mmol/L (04.27.18 @ 13:34)    FENa = 1.2%        Culture - Tissue with Gram Stain (collected 26 Apr 2018 21:22)  Source: .Tissue Other, subdural lesion  Gram Stain (27 Apr 2018 02:07):    No polymorphonuclear cells seen per low power field    No organisms seen per oil power field        RADIOLOGY & ADDITIONAL TESTS:    Imaging Personally Reviewed:  Consultant(s) Notes Reviewed:    Care Discussed with Consultants/Other Providers:

## 2018-04-27 NOTE — PROGRESS NOTE ADULT - ASSESSMENT
Summary: s/p craniotomy for lesion of unclear etiology and significance.      NEURO:  q1h neuro checks, keppra 1g bid, followup biopsy results, f/u CT in AM, MRI brain wwo     CARDS:  -150, amlodipine 5 mg     PULM:  sat > 92%, IS    RENAL: NS @ 75 ml/hr, IVL when po intake sufficient     GASTRO:  advance as tolerated  ---> Stress ulcer prophylaxis:  not indicated    HEME:  monitor H/H    ---> DVT prophylaxis: SCDs, hold anticoagulation, fresh post op, start PPX pending AM CT    ENDO:  euglycemia    ID:  afebrile    Code status:  Full code  Disposition:  ICU    This patient was at high risk of neurologic deterioration and/or death due to: seizure, post operative hemorrhage Summary: s/p craniotomy for lesion of unclear etiology and significance.      NEURO:  q4h neuro checks, CT head stable, follow the pathology results, MRI brain wwo     CARDS:  -150, amlodipine 5 mg     PULM:  sat > 92%, IS    RENAL: NS @ 75 ml/hr, IVL when po intake sufficient, creat increased JIMY, 1.2=>1.48, urine electrolytes, US kidney, avoid nephrotoxic medication     GASTRO:  advance as tolerated  ---> Stress ulcer prophylaxis:  not indicated    HEME:  monitor H/H    ---> DVT prophylaxis: SCDs, heparin 5000 unit sc q 8 hours     ENDO:  euglycemia    ID:  afebrile    Code status:  Full code  Disposition:  floor

## 2018-04-27 NOTE — PROGRESS NOTE ADULT - SUBJECTIVE AND OBJECTIVE BOX
HPI:  This is a 62 year old male tx from [facility] history of cocaine use / ETOH abuse / marijuana use presented initially with seizures. Was found shaking, drooling with eyes open, unable to speak for 1 hour. Had similar episode as per friend prior in apartment prior to calling 911. Was given keppra / ativan with effect. CTH was completed at outside hospital with suggestion of chronic hematoma vs left extraxial mass. There is 2 mm of shift. On presentation at [facility] he is GCS 15.      PMHX: none    Meds: none    SH: cocaine abuse, lives in [place]	    CTH: left frontal and high left fronto/parietal extraxial lesions vs SDH measuring 5.8cm and 5.9 cm respectively. (22 Apr 2018 03:09)    OVERNIGHT EVENTS:   CIWA discontinued    VITALS:  T(C): , Max: 37.1 (04-26-18 @ 14:20)  HR:  (52 - 91)  BP:  (133/64 - 166/94)  ABP: --  RR:  (11 - 27)  SpO2:  (94% - 100%)  Wt(kg): --      LABS:  Na: 140 (04-26 @ 20:53), 140 (04-25 @ 07:05)  K: 4.5 (04-26 @ 20:53), 4.1 (04-25 @ 07:05)  Cl: 102 (04-26 @ 20:53), 105 (04-25 @ 07:05)  CO2: 23 (04-26 @ 20:53), 23 (04-25 @ 07:05)  BUN: 13 (04-26 @ 20:53), 12 (04-25 @ 07:05)  Cr: 1.48 (04-26 @ 20:53), 1.35 (04-25 @ 07:05)  Glu:     Hgb: 15.5 (04-26 @ 20:53), 14.4 (04-24 @ 08:02)  Hct: 46.3 (04-26 @ 20:53), 42.7 (04-24 @ 08:02)  WBC: 5.7 (04-26 @ 20:53), 6.07 (04-24 @ 08:02)  Plt: 185 (04-26 @ 20:53), 165 (04-24 @ 08:02)      IMAGING:   Recent imaging studies were reviewed.    MEDICATIONS:  acetaminophen   Tablet. 325 milliGRAM(s) Oral every 4 hours PRN  amLODIPine   Tablet 5 milliGRAM(s) Oral daily  docusate sodium 100 milliGRAM(s) Oral three times a day  folic acid 1 milliGRAM(s) Oral daily  influenza   Vaccine 0.5 milliLiter(s) IntraMuscular once  levETIRAcetam 1000 milliGRAM(s) Oral two times a day  multivitamin 1 Tablet(s) Oral daily  senna 2 Tablet(s) Oral at bedtime  sodium chloride 0.9% with potassium chloride 20 mEq/L 1000 milliLiter(s) IV Continuous <Continuous>  thiamine 500 milliGRAM(s) Oral daily    EXAMINATION:  General:  calm  HEENT:  MMM  Neuro:  awake, alert, oriented x 3, JOSEE,  follows commands, moves all extremities 5/5, no sensory deficits  Cards:  RRR  Respiratory:  no respiratory distress  Adomen:  soft  Skin:  warm/dry HPI:  This is a 62 year old male tx from [facility] history of cocaine use / ETOH abuse / marijuana use presented initially with seizures. Was found shaking, drooling with eyes open, unable to speak for 1 hour. Had similar episode as per friend prior in apartment prior to calling 911. Was given keppra / ativan with effect. CTH was completed at outside hospital with suggestion of chronic hematoma vs left extraxial mass. There is 2 mm of shift. On presentation at [facility] he is GCS 15.      PMHX: none    Meds: none    SH: cocaine abuse, lives in [place]	    CTH: left frontal and high left fronto/parietal extraxial lesions vs SDH measuring 5.8cm and 5.9 cm respectively. (22 Apr 2018 03:09)            REVIEW OF SYSTEMS: [ ] Unable to Assess due to neurologic exam   [x] All ROS addressed below are non-contributory, except:  Neuro: [ ] Headache [ ] Back pain [ ] Numbness [ ] Weakness [ ] Ataxia [ ] Dizziness [ ] Aphasia [ ] Dysarthria [ ] Visual disturbance  Resp: [ ] Shortness of breath/dyspnea, [ ] Orthopnea [ ] Cough  CV: [ ] Chest pain [ ] Palpitation [ ] Lightheadedness [ ] Syncope  Renal: [ ] Thirst [ ] Edema  GI: [ ] Nausea [ ] Emesis [ ] Abdominal pain [ ] Constipation [ ] Diarrhea  Hem: [ ] Hematemesis [ ] bright red blood per rectum  ID: [ ] Fever [ ] Chills [ ] Dysuria  ENT: [ ] Rhinorrhea          OVERNIGHT EVENTS:   CIWA discontinued    T(C): 37.4 (04-27-18 @ 07:00), Max: 37.4 (04-27-18 @ 07:00)  HR: 117 (04-27-18 @ 10:00) (52 - 117)  BP: 139/82 (04-27-18 @ 09:00) (133/64 - 166/94)  RR: 27 (04-27-18 @ 10:00) (11 - 27)  SpO2: 96% (04-27-18 @ 10:00) (94% - 100%)  04-26-18 @ 07:01  -  04-27-18 @ 07:00  --------------------------------------------------------  IN: 1780 mL / OUT: 2025 mL / NET: -245 mL    04-27-18 @ 07:01  -  04-27-18 @ 11:49  --------------------------------------------------------  IN: 300 mL / OUT: 300 mL / NET: 0 mL    acetaminophen   Tablet. 325 milliGRAM(s) Oral every 4 hours PRN  amLODIPine   Tablet 5 milliGRAM(s) Oral daily  docusate sodium 100 milliGRAM(s) Oral three times a day  folic acid 1 milliGRAM(s) Oral daily  influenza   Vaccine 0.5 milliLiter(s) IntraMuscular once  levETIRAcetam 1000 milliGRAM(s) Oral two times a day  multivitamin 1 Tablet(s) Oral daily  senna 2 Tablet(s) Oral at bedtime  sodium chloride 0.9% with potassium chloride 20 mEq/L 1000 milliLiter(s) IV Continuous <Continuous>  thiamine 500 milliGRAM(s) Oral daily    Culture - Tissue with Gram Stain (collected 04-26-18 @ 21:22)  Source: .Tissue Other, subdural lesion  Gram Stain (04-27-18 @ 02:07):    No polymorphonuclear cells seen per low power field    No organisms seen per oil power field        LABS:  Na: 140 (04-26 @ 20:53), 140 (04-25 @ 07:05)  K: 4.5 (04-26 @ 20:53), 4.1 (04-25 @ 07:05)  Cl: 102 (04-26 @ 20:53), 105 (04-25 @ 07:05)  CO2: 23 (04-26 @ 20:53), 23 (04-25 @ 07:05)  BUN: 13 (04-26 @ 20:53), 12 (04-25 @ 07:05)  Cr: 1.48 (04-26 @ 20:53), 1.35 (04-25 @ 07:05)  Glu:     Hgb: 15.5 (04-26 @ 20:53), 14.4 (04-24 @ 08:02)  Hct: 46.3 (04-26 @ 20:53), 42.7 (04-24 @ 08:02)  WBC: 5.7 (04-26 @ 20:53), 6.07 (04-24 @ 08:02)  Plt: 185 (04-26 @ 20:53), 165 (04-24 @ 08:02)      IMAGING:   Recent imaging studies were reviewed.      EXAMINATION:  General:  calm  HEENT:  MMM  Neuro:  awake, alert, oriented x 3, JOSEE,  follows commands, moves all extremities 5/5, no sensory deficits  Cards:  RRR  Respiratory:  no respiratory distress  Adomen:  soft  Skin:  warm/dry

## 2018-04-27 NOTE — DIETITIAN INITIAL EVALUATION ADULT. - ENERGY NEEDS
Ht 65 inches Wt 175 pounds BMI 29.1 Kg/m^2   pounds +/- 10%; 129% IBW  Edema: none noted Skin: no pressure injuries  Other pertinent information: 61 yo male admitted to [facility] following cocaine/EtOH/marijuana abuse with subsequent seizure. CTH at OSH revealed chronic hematoma vs left extraaxial mass. Transferred to [facility] and now S/P craniotomy for resection of lesion. +CIWA scoring

## 2018-04-27 NOTE — PROGRESS NOTE ADULT - SUBJECTIVE AND OBJECTIVE BOX
Patient seen and examined at bedside.    T(C): 36.7 (04-26-18 @ 23:00), Max: 37.1 (04-26-18 @ 14:20)  HR: 72 (04-27-18 @ 00:00) (52 - 85)  BP: 133/86 (04-27-18 @ 00:00) (131/70 - 161/104)  RR: 22 (04-27-18 @ 00:00) (11 - 23)  SpO2: 100% (04-27-18 @ 00:00) (94% - 100%)  Wt(kg): --    EXAM:    AOx3, Following Commands  CN: PERRL, EOMI, no facial droop  Motor: 5/5 throughout, no drift  Sensation intact to light touch  Reflexes: no clonus

## 2018-04-27 NOTE — DIETITIAN INITIAL EVALUATION ADULT. - ADHERENCE
Patient not following any specific dietary restrictions PTA. Denied any micronutrient supplementation. Reports NKFA. Per chart, patient with history of EtOH/cocaine/marijuana abuse on [place] protocol/n/a

## 2018-04-27 NOTE — DIETITIAN INITIAL EVALUATION ADULT. - NS AS NUTRI INTERV MEALS SNACK
1) Continue regular diet to optimize PO intakes 2) Provide food preferences within dietary restrictions when feasible 3) Encourage good PO intakes

## 2018-04-27 NOTE — DIETITIAN INITIAL EVALUATION ADULT. - OTHER INFO
Patient seen for length of stay on NSCU. Reports UBW of 175 pounds, denies any changes in weight PTA. Noted admit weight of 175 pounds. Reports good appetite but fair PO intakes due to distaste for institutionalized foods. Reviewed menu ordering process and alternative menu with patient. Discussed importance of adequate protein intake to promote healing. Amenable to receiving health shake 1x daily. Denies nausea/emesis. Last BM 4/26

## 2018-04-27 NOTE — PROGRESS NOTE ADULT - ASSESSMENT
62M s/p Left crani for resection of brain tumor of subdural space    -neurochecks q1h  -CT in AM  -F/u Pathology  -CT chest/abd/pelvis  -MRI brain

## 2018-04-27 NOTE — DIETITIAN INITIAL EVALUATION ADULT. - ORAL INTAKE PTA
good/Patient endorsed a good appetite & PO intake PTA. Typically will have 3 meals daily with a variety of different foods

## 2018-04-27 NOTE — DIETITIAN INITIAL EVALUATION ADULT. - NS AS NUTRI INTERV ED CONTENT
Reviewed menu ordering process and alternative menu with patient. Discussed importance of adequate protein intake to promote healing post-operatively. Provided brief review of good sources of protein and encouraged patient to consume protein at meals first then work his way around the tray

## 2018-04-27 NOTE — DIETITIAN INITIAL EVALUATION ADULT. - NUTRITION INTERVENTION
Medical Food Supplements/Nutrition Education/Collaboration and Referral of Nutrition Care/Meals and Snack Medical Food Supplements/Vitamin/Meals and Snack/Collaboration and Referral of Nutrition Care/Nutrition Education

## 2018-04-27 NOTE — PROGRESS NOTE ADULT - PROBLEM SELECTOR PLAN 5
no significant proteinuria - bland UA - trend BMP - cr fluctuating, FENa 1.2% c/w post or intrinsic renal - f/u renal US. Check hepatitis/HIV.

## 2018-04-28 LAB
ANION GAP SERPL CALC-SCNC: 12 MMOL/L — SIGNIFICANT CHANGE UP (ref 5–17)
APPEARANCE UR: CLEAR — SIGNIFICANT CHANGE UP
BILIRUB UR-MCNC: NEGATIVE — SIGNIFICANT CHANGE UP
BUN SERPL-MCNC: 8 MG/DL — SIGNIFICANT CHANGE UP (ref 7–23)
CALCIUM SERPL-MCNC: 8.3 MG/DL — LOW (ref 8.4–10.5)
CHLORIDE SERPL-SCNC: 107 MMOL/L — SIGNIFICANT CHANGE UP (ref 96–108)
CO2 SERPL-SCNC: 20 MMOL/L — LOW (ref 22–31)
COLOR SPEC: SIGNIFICANT CHANGE UP
CREAT SERPL-MCNC: 1.13 MG/DL — SIGNIFICANT CHANGE UP (ref 0.5–1.3)
DIFF PNL FLD: NEGATIVE — SIGNIFICANT CHANGE UP
GLUCOSE SERPL-MCNC: 87 MG/DL — SIGNIFICANT CHANGE UP (ref 70–99)
GLUCOSE UR QL: NEGATIVE — SIGNIFICANT CHANGE UP
HAV IGM SER-ACNC: SIGNIFICANT CHANGE UP
HBV CORE AB SER-ACNC: SIGNIFICANT CHANGE UP
HBV CORE IGM SER-ACNC: SIGNIFICANT CHANGE UP
HBV SURFACE AG SER-ACNC: SIGNIFICANT CHANGE UP
HCT VFR BLD CALC: 39.6 % — SIGNIFICANT CHANGE UP (ref 39–50)
HCV AB S/CO SERPL IA: 0.12 S/CO — SIGNIFICANT CHANGE UP
HCV AB SERPL-IMP: SIGNIFICANT CHANGE UP
HGB BLD-MCNC: 13.1 G/DL — SIGNIFICANT CHANGE UP (ref 13–17)
HIV 1+2 AB+HIV1 P24 AG SERPL QL IA: SIGNIFICANT CHANGE UP
KETONES UR-MCNC: NEGATIVE — SIGNIFICANT CHANGE UP
LEUKOCYTE ESTERASE UR-ACNC: NEGATIVE — SIGNIFICANT CHANGE UP
MAGNESIUM SERPL-MCNC: 1.9 MG/DL — SIGNIFICANT CHANGE UP (ref 1.6–2.6)
MCHC RBC-ENTMCNC: 29.5 PG — SIGNIFICANT CHANGE UP (ref 27–34)
MCHC RBC-ENTMCNC: 32.9 GM/DL — SIGNIFICANT CHANGE UP (ref 32–36)
MCV RBC AUTO: 89.5 FL — SIGNIFICANT CHANGE UP (ref 80–100)
NITRITE UR-MCNC: NEGATIVE — SIGNIFICANT CHANGE UP
PH UR: 5.5 — SIGNIFICANT CHANGE UP (ref 5–8)
PHOSPHATE SERPL-MCNC: 2.2 MG/DL — LOW (ref 2.5–4.5)
PLATELET # BLD AUTO: 161 K/UL — SIGNIFICANT CHANGE UP (ref 150–400)
POTASSIUM SERPL-MCNC: 5.2 MMOL/L — SIGNIFICANT CHANGE UP (ref 3.5–5.3)
POTASSIUM SERPL-SCNC: 5.2 MMOL/L — SIGNIFICANT CHANGE UP (ref 3.5–5.3)
PROT UR-MCNC: NEGATIVE — SIGNIFICANT CHANGE UP
RBC # BLD: 4.43 M/UL — SIGNIFICANT CHANGE UP (ref 4.2–5.8)
RBC # FLD: 12.3 % — SIGNIFICANT CHANGE UP (ref 10.3–14.5)
SODIUM SERPL-SCNC: 139 MMOL/L — SIGNIFICANT CHANGE UP (ref 135–145)
SP GR SPEC: 1.01 — SIGNIFICANT CHANGE UP (ref 1.01–1.02)
UROBILINOGEN FLD QL: NEGATIVE — SIGNIFICANT CHANGE UP
WBC # BLD: 10.8 K/UL — HIGH (ref 3.8–10.5)
WBC # FLD AUTO: 10.8 K/UL — HIGH (ref 3.8–10.5)

## 2018-04-28 PROCEDURE — 99233 SBSQ HOSP IP/OBS HIGH 50: CPT

## 2018-04-28 RX ORDER — SODIUM,POTASSIUM PHOSPHATES 278-250MG
1 POWDER IN PACKET (EA) ORAL
Qty: 0 | Refills: 0 | Status: DISCONTINUED | OUTPATIENT
Start: 2018-04-28 | End: 2018-04-28

## 2018-04-28 RX ADMIN — HEPARIN SODIUM 5000 UNIT(S): 5000 INJECTION INTRAVENOUS; SUBCUTANEOUS at 21:19

## 2018-04-28 RX ADMIN — AMLODIPINE BESYLATE 5 MILLIGRAM(S): 2.5 TABLET ORAL at 05:06

## 2018-04-28 RX ADMIN — LEVETIRACETAM 1000 MILLIGRAM(S): 250 TABLET, FILM COATED ORAL at 05:06

## 2018-04-28 RX ADMIN — Medication 100 MILLIGRAM(S): at 21:20

## 2018-04-28 RX ADMIN — Medication 100 MILLIGRAM(S): at 05:06

## 2018-04-28 RX ADMIN — HEPARIN SODIUM 5000 UNIT(S): 5000 INJECTION INTRAVENOUS; SUBCUTANEOUS at 05:06

## 2018-04-28 RX ADMIN — Medication 62.5 MILLIMOLE(S): at 13:13

## 2018-04-28 RX ADMIN — Medication 500 MILLIGRAM(S): at 13:14

## 2018-04-28 RX ADMIN — Medication 100 MILLIGRAM(S): at 13:14

## 2018-04-28 RX ADMIN — SENNA PLUS 2 TABLET(S): 8.6 TABLET ORAL at 21:20

## 2018-04-28 RX ADMIN — LEVETIRACETAM 1000 MILLIGRAM(S): 250 TABLET, FILM COATED ORAL at 17:39

## 2018-04-28 RX ADMIN — Medication 1 TABLET(S): at 13:14

## 2018-04-28 RX ADMIN — Medication 1 MILLIGRAM(S): at 13:15

## 2018-04-28 RX ADMIN — HEPARIN SODIUM 5000 UNIT(S): 5000 INJECTION INTRAVENOUS; SUBCUTANEOUS at 13:15

## 2018-04-28 NOTE — PROGRESS NOTE ADULT - PROBLEM SELECTOR PLAN 4
SW eval, f/u nonurgent TTE pending SW eval, f/u nonurgent TTE pending - can be done as outpatient - no s/s of volume overload

## 2018-04-28 NOTE — PROGRESS NOTE ADULT - SUBJECTIVE AND OBJECTIVE BOX
Patient is a 62y old  Male who presents with a chief complaint of seizure (2018 03:09)        SUBJECTIVE / OVERNIGHT EVENTS:      MEDICATIONS  (STANDING):  amLODIPine   Tablet 5 milliGRAM(s) Oral daily  docusate sodium 100 milliGRAM(s) Oral three times a day  folic acid 1 milliGRAM(s) Oral daily  heparin  Injectable 5000 Unit(s) SubCutaneous every 8 hours  influenza   Vaccine 0.5 milliLiter(s) IntraMuscular once  levETIRAcetam 1000 milliGRAM(s) Oral two times a day  multivitamin 1 Tablet(s) Oral daily  senna 2 Tablet(s) Oral at bedtime  sodium phosphate IVPB 15 milliMole(s) IV Intermittent once  thiamine 500 milliGRAM(s) Oral daily    MEDICATIONS  (PRN):  acetaminophen   Tablet. 325 milliGRAM(s) Oral every 4 hours PRN Mild Pain (1 - 3)      Vital Signs Last 24 Hrs  T(C): 37.4 (2018 08:28), Max: 38.1 (2018 22:05)  T(F): 99.4 (2018 08:28), Max: 100.6 (2018 22:05)  HR: 68 (2018 08:28) (68 - 98)  BP: 136/82 (2018 08:28) (122/76 - 138/80)  BP(mean): 94 (2018 12:00) (94 - 94)  RR: 18 (2018 08:28) (18 - 18)  SpO2: 97% (2018 08:28) (96% - 100%)  CAPILLARY BLOOD GLUCOSE        I&O's Summary    2018 07:01  -  2018 07:00  --------------------------------------------------------  IN: 1295 mL / OUT: 1770 mL / NET: -475 mL        PHYSICAL EXAM  GENERAL: NAD, well-developed  EYES: conjunctiva and sclera clear  HEAD: L sided surgical incision some dried blood  NECK: Supple, No JVD  ENT: MMM  CHEST/LUNG: Clear to auscultation bilaterally; No wheeze  HEART: Regular rate and rhythm; No murmurs  ABDOMEN: Soft, Nontender, Nondistended; Bowel sounds present  EXTREMITIES:  2+ Peripheral Pulses, No clubbing, cyanosis, or edema  NEURO: nonfocal, AOx3 no pronator drift  PSYCH: calm   SKIN: No rashes or lesions    LABS:                        13.1   10.8  )-----------( 161      ( 2018 06:19 )             39.6     -    139  |  107  |  8   ----------------------------<  87  5.2   |  20<L>  |  1.13    Ca    8.3<L>      2018 06:19  Phos  2.2       Mg     1.9                 Urinalysis Basic - ( 2018 23:08 )    Color: PL Yellow / Appearance: Clear / S.010 / pH: x  Gluc: x / Ketone: Negative  / Bili: Negative / Urobili: Negative   Blood: x / Protein: Negative / Nitrite: Negative   Leuk Esterase: Negative / RBC: x / WBC x   Sq Epi: x / Non Sq Epi: x / Bacteria: x        RADIOLOGY & ADDITIONAL TESTS:    Imaging Personally Reviewed: CXR film reviewed - no consolidation, no atelectasis noted   Consultant(s) Notes Reviewed:    Care Discussed with Consultants/Other Providers: d/w Neurosurgery PA - Alexa regarding plan of care Patient is a 62y old  Male who presents with a chief complaint of seizure (2018 03:09)        SUBJECTIVE / OVERNIGHT EVENTS: no acute complaints. normal bm today      MEDICATIONS  (STANDING):  amLODIPine   Tablet 5 milliGRAM(s) Oral daily  docusate sodium 100 milliGRAM(s) Oral three times a day  folic acid 1 milliGRAM(s) Oral daily  heparin  Injectable 5000 Unit(s) SubCutaneous every 8 hours  influenza   Vaccine 0.5 milliLiter(s) IntraMuscular once  levETIRAcetam 1000 milliGRAM(s) Oral two times a day  multivitamin 1 Tablet(s) Oral daily  senna 2 Tablet(s) Oral at bedtime  sodium phosphate IVPB 15 milliMole(s) IV Intermittent once  thiamine 500 milliGRAM(s) Oral daily    MEDICATIONS  (PRN):  acetaminophen   Tablet. 325 milliGRAM(s) Oral every 4 hours PRN Mild Pain (1 - 3)      Vital Signs Last 24 Hrs  T(C): 37.4 (2018 08:28), Max: 38.1 (2018 22:05)  T(F): 99.4 (2018 08:28), Max: 100.6 (2018 22:05)  HR: 68 (2018 08:28) (68 - 98)  BP: 136/82 (2018 08:28) (122/76 - 138/80)  BP(mean): 94 (2018 12:00) (94 - 94)  RR: 18 (2018 08:28) (18 - 18)  SpO2: 97% (2018 08:28) (96% - 100%)  CAPILLARY BLOOD GLUCOSE        I&O's Summary    2018 07:01  -  2018 07:00  --------------------------------------------------------  IN: 1295 mL / OUT: 1770 mL / NET: -475 mL        PHYSICAL EXAM  GENERAL: NAD, well-developed  EYES: conjunctiva and sclera clear  HEAD: L craniotomy with staples, c/d/i  NECK: Supple, No JVD  ENT: MMM  CHEST/LUNG: Clear to auscultation bilaterally; No wheeze  HEART: Regular rate and rhythm; No murmurs  ABDOMEN: Soft, Nontender, Nondistended; Bowel sounds present  EXTREMITIES:  2+ Peripheral Pulses, No clubbing, cyanosis, or edema  NEURO: nonfocal, AOx3 no pronator drift  PSYCH: calm   SKIN: No rashes or lesions    LABS:                        13.1   10.8  )-----------( 161      ( 2018 06:19 )             39.6         139  |  107  |  8   ----------------------------<  87  5.2   |  20<L>  |  1.13    Ca    8.3<L>      2018 06:19  Phos  2.2       Mg     1.9                 Urinalysis Basic - ( 2018 23:08 )    Color: PL Yellow / Appearance: Clear / S.010 / pH: x  Gluc: x / Ketone: Negative  / Bili: Negative / Urobili: Negative   Blood: x / Protein: Negative / Nitrite: Negative   Leuk Esterase: Negative / RBC: x / WBC x   Sq Epi: x / Non Sq Epi: x / Bacteria: x        RADIOLOGY & ADDITIONAL TESTS:    Imaging Personally Reviewed: CXR film reviewed - no consolidation, no atelectasis noted   Consultant(s) Notes Reviewed:    Care Discussed with Consultants/Other Providers: d/w Neurosurgery PA - Alexa regarding plan of care

## 2018-04-28 NOTE — PROGRESS NOTE ADULT - ASSESSMENT
This is a 62 year old male tx from [facility] history of cocaine use / ETOH abuse / marijuana use presented initially with seizures. Was found shaking, drooling with eyes open, unable to speak for 1 hour. Had similar episode as per friend prior in apartment prior to calling 911. Was given keppra / ativan with effect. CTH was completed at outside hospital with suggestion of chronic hematoma vs left extraxial mass. There is 2 mm of shift. On presentation at [facility] he is GCS 15. Severely dysarthric. Denies HA, cervical pain, blurry vision, nausea or vomiting. Hypertensive 203/115 in ED, was given labetalol with effect. Afebrile. No leukocytosis.     PMHX: none    Meds: none    SH: cocaine abuse, lives in far [place]	    CTH: left frontal and high left fronto/parietal extraxial lesions vs SDH measuring 5.8cm and 5.9 cm respectively. (22 Apr 2018 03:09)    PAST MEDICAL & SURGICAL HISTORY:  HTN (hypertension)  No significant past surgical history    PROCEDURE:  4/26/18 s/p left craniectomy with SDH evacuation and biopsy.     PLAN:  Neuro: cont keppra for seizure prophylaxis,  appreciate MRI results- reviewed with Dr Trujillo  Respiratory: patient instructed on incentive spirometer  CV: continue norvasc for HTN; ECHO ordered given abuse history but may be done as outpatient  Heme/Onc: f/u biopsy results  DVT ppx: [x] SQH [] SQL and venodynes bilaterally  Renal: BUN/creatinine improved, will IVL and f/u am labs  ID: f/u 4/27 blood cultures and monitor for further fevers  GI: bowel regimen  PT/OT: TBD  Hospitalist Medicine following  off [facility] now with no evidence of withdrawal, social work saw.    discussed at bedside with Dr Cassandra Feliciano # [id number]

## 2018-04-28 NOTE — PROGRESS NOTE ADULT - SUBJECTIVE AND OBJECTIVE BOX
SUBJECTIVE:    Henry Veloz is an 42year old female who presents for evaluation and treatment of sore throat. Symptoms include ear pain bilaterally, congestion, sore throat, swollen glands, fatigue, fever, headache and cough. Onset of symptoms were 1 week ago, and the onset and/or course has been gradual and worsening. Pt denies vomiting and diarrhea. Strep exposure:  household exposure. Arielle  is a non-smoker. She has no history of: significant recurrent respiratory illnesses. Medication and allergies reviewed. OBJECTIVE:    Visit Vitals  /62   Pulse 68   Temp 98.6 Â°F (37 Â°C) (Oral)   Ht 6' (1.829 m)   Wt 78.5 kg   SpO2 98%   BMI 23.46 kg/mÂ²       General appearance: Healthy, alert  Eyes: normal without discharge  Ears: R TM - normal, L TM - normal  Nose: mucosal erythema and nasal congestion bilaterally  Oropharynx: moderate erythema and throat culture obtained  Neck: normal, supple and mild anterior lymphadenopathy  Lungs: clear to auscultation  Heart: regular rate and rhythm and no murmurs, clicks, or gallops    Strep screen, result: pending    ASSESSMENT:    Acute pharyngitis - r/o Strep      PLAN:    >Rest and increase activity as tolerated. >Tylenol for fever prn.  >Warm saline gargles prn and liberal fluid intake   >Will f/u with results and treatment plan  >Recheck with PMD or WIC, if not improving this week. CHIEF COMPLAINT: patient without complaints other than mild incisional pain, denies new visual changes or nausea/vomiting.    OVERNIGHT: [facility] [place] [facility];  tmax 100.6- CXR-neg, UA neg, blood cultures testing    Vital Signs Last 24 Hrs  T(C): 37.4 (28 Apr 2018 08:28), Max: 38.1 (27 Apr 2018 22:05)  T(F): 99.4 (28 Apr 2018 08:28), Max: 100.6 (27 Apr 2018 22:05)  HR: 68 (28 Apr 2018 08:28) (68 - 98)  BP: 136/82 (28 Apr 2018 08:28) (122/76 - 138/80)  BP(mean): --  RR: 18 (28 Apr 2018 08:28) (18 - 18)  SpO2: 97% (28 Apr 2018 08:28) (96% - 98%)    PHYSICAL EXAM:    General: No Acute Distress     Neurological: Awake, alert oriented to person, place and time, Following Commands, PERRL, EOMI, Face Symmetrical, Speech Fluent, Moving all extremities, Muscle Strength normal in all four extremities, No Drift, Sensation to Light Touch Intact    Pulmonary: Clear to Auscultation, No Rales, No Rhonchi, No Wheezes     Cardiovascular: S1, S2, Regular Rate and Rhythm     Gastrointestinal: Soft, Nontender, Nondistended     Incision: +staples c/d/i    LABS:                        13.1   10.8  )-----------( 161      ( 28 Apr 2018 06:19 )             39.6    04-28    139  |  107  |  8   ----------------------------<  87  5.2   |  20<L>  |  1.13    Ca    8.3<L>      28 Apr 2018 06:19  Phos  2.2     04-28  Mg     1.9     04-28      Hemoglobin A1C, Whole Blood: 5.6 % (04-24 @ 08:02)      04-27 @ 07:01  -  04-28 @ 07:00  --------------------------------------------------------  IN: 1295 mL / OUT: 1770 mL / NET: -475 mL        MEDICATIONS:  Anticoagulation:  heparin  Injectable 5000 Unit(s) SubCutaneous every 8 hours    Neuro:  acetaminophen   Tablet. 325 milliGRAM(s) Oral every 4 hours PRN Mild Pain (1 - 3)  levETIRAcetam 1000 milliGRAM(s) Oral two times a day    Cardiac:  amLODIPine   Tablet 5 milliGRAM(s) Oral daily    GI/:  docusate sodium 100 milliGRAM(s) Oral three times a day  senna 2 Tablet(s) Oral at bedtime    Other:   folic acid 1 milliGRAM(s) Oral daily  influenza   Vaccine 0.5 milliLiter(s) IntraMuscular once  multivitamin 1 Tablet(s) Oral daily  sodium phosphate IVPB 15 milliMole(s) IV Intermittent once  thiamine 500 milliGRAM(s) Oral daily    DIET: [x] Regular [] CCD [] Renal [] Puree [] Dysphagia [] Tube Feeds:     IMAGING:   < from: Xray Chest 1 View- PORTABLE-Urgent (04.27.18 @ 23:21) >  IMPRESSION:  No acute pulmonary disease.  < end of copied text >    < from: MR Head w/wo IV Cont (04.27.18 @ 18:43) >  INTERPRETATION:  History: Subdural lesion s/p seizure, presumed SDH, s/p   craniotomy, encountered tumor      Technique: MRI of the brain was performed with and without contrast    Sagittal T1, axial T2, FLAIR, GRE, SWI, SPGR, diffusion-weighted images   and an ADC map were obtained.Contrast enhanced axial T1 CTR sequences   were obtained.      10 mls of Gadavist was administered intravenously without complication   and 0 mls were discarded.    COMPARISON: CT head dated 4/27/2018.    FINDINGS:  High left parietal craniectomy with cranioplasty is seen. A slightly   lobulated left cerebral convexity dural based mass demonstrates   heterogeneous T1 and T2 signal. Hyperdensity on CT within this lesion   corresponds to areas of hypointense T2 hyperintense T1 signal which may   represent proteinaceous material. There is minor peripheral enhancement   of this lesion which may represent dural enhancement. There is no   diffusion restriction. Small amounts of susceptibility within this lesion   as well as bordering the lesion corresponding to possible areas of   calcification versus hemorrhage given recent postoperative status. Mass   effect upon the subjacent left cerebral hemisphere is unchanged from   prior CT.    Basilar cisterns are patent. There is no diffusion restriction to suggest   acute or subacute infarction within the brain parenchyma. There are no   additional areas of susceptibility abnormality within the brain. A thin   left frontal convexity subdural collection may be postoperative in nature.    The calvarium otherwise appears intact. Major flow-voids at the base of   the brain follow expected course and contour.    The visualized intraorbital compartments, paranasal sinuses and   tympanomastoid cavities appear free of acute disease.        IMPRESSION:  Redemonstration of a left cerebral convexity extra-axial axial dural   based mass which predominately does not enhance, withunchanged mass   effect upon the left cerebral hemisphere. The differential diagnosis for   these findings includes sarcoidosis versus a partially calcified chronic   subdural hematoma which may contain areas of proteinaceous debris and   fibrosis. Neoplastic disease is considered less likely given lack of   associated enhancement and abnormality involving the overlying bone.  < end of copied text >

## 2018-04-28 NOTE — PROGRESS NOTE ADULT - PROBLEM SELECTOR PLAN 5
no significant proteinuria - bland UA - trend BMP - cr fluctuating, FENa 1.2% c/w post or intrinsic renal - f/u renal US. Check hepatitis/HIV. creatinine improved with fluids  no need for renal us

## 2018-04-29 ENCOUNTER — TRANSCRIPTION ENCOUNTER (OUTPATIENT)
Age: 63
End: 2018-04-29

## 2018-04-29 VITALS — HEART RATE: 88 BPM | SYSTOLIC BLOOD PRESSURE: 124 MMHG | OXYGEN SATURATION: 98 % | DIASTOLIC BLOOD PRESSURE: 88 MMHG

## 2018-04-29 DIAGNOSIS — R50.82 POSTPROCEDURAL FEVER: ICD-10-CM

## 2018-04-29 DIAGNOSIS — R56.9 UNSPECIFIED CONVULSIONS: ICD-10-CM

## 2018-04-29 LAB
ANION GAP SERPL CALC-SCNC: 14 MMOL/L — SIGNIFICANT CHANGE UP (ref 5–17)
BASOPHILS # BLD AUTO: 0.1 K/UL — SIGNIFICANT CHANGE UP (ref 0–0.2)
BASOPHILS NFR BLD AUTO: 0.7 % — SIGNIFICANT CHANGE UP (ref 0–2)
BUN SERPL-MCNC: 10 MG/DL — SIGNIFICANT CHANGE UP (ref 7–23)
CALCIUM SERPL-MCNC: 9.3 MG/DL — SIGNIFICANT CHANGE UP (ref 8.4–10.5)
CHLORIDE SERPL-SCNC: 101 MMOL/L — SIGNIFICANT CHANGE UP (ref 96–108)
CO2 SERPL-SCNC: 22 MMOL/L — SIGNIFICANT CHANGE UP (ref 22–31)
CREAT SERPL-MCNC: 1.21 MG/DL — SIGNIFICANT CHANGE UP (ref 0.5–1.3)
EOSINOPHIL # BLD AUTO: 0 K/UL — SIGNIFICANT CHANGE UP (ref 0–0.5)
EOSINOPHIL NFR BLD AUTO: 0.4 % — SIGNIFICANT CHANGE UP (ref 0–6)
GLUCOSE SERPL-MCNC: 83 MG/DL — SIGNIFICANT CHANGE UP (ref 70–99)
HCT VFR BLD CALC: 40.8 % — SIGNIFICANT CHANGE UP (ref 39–50)
HGB BLD-MCNC: 13.5 G/DL — SIGNIFICANT CHANGE UP (ref 13–17)
LYMPHOCYTES # BLD AUTO: 29.2 % — SIGNIFICANT CHANGE UP (ref 13–44)
LYMPHOCYTES # BLD AUTO: 3.3 K/UL — SIGNIFICANT CHANGE UP (ref 1–3.3)
MCHC RBC-ENTMCNC: 29.8 PG — SIGNIFICANT CHANGE UP (ref 27–34)
MCHC RBC-ENTMCNC: 33.2 GM/DL — SIGNIFICANT CHANGE UP (ref 32–36)
MCV RBC AUTO: 89.7 FL — SIGNIFICANT CHANGE UP (ref 80–100)
MONOCYTES # BLD AUTO: 1.5 K/UL — HIGH (ref 0–0.9)
MONOCYTES NFR BLD AUTO: 13.6 % — SIGNIFICANT CHANGE UP (ref 2–14)
NEUTROPHILS # BLD AUTO: 6.4 K/UL — SIGNIFICANT CHANGE UP (ref 1.8–7.4)
NEUTROPHILS NFR BLD AUTO: 56.1 % — SIGNIFICANT CHANGE UP (ref 43–77)
PLATELET # BLD AUTO: 189 K/UL — SIGNIFICANT CHANGE UP (ref 150–400)
POTASSIUM SERPL-MCNC: 3.8 MMOL/L — SIGNIFICANT CHANGE UP (ref 3.5–5.3)
POTASSIUM SERPL-SCNC: 3.8 MMOL/L — SIGNIFICANT CHANGE UP (ref 3.5–5.3)
RBC # BLD: 4.54 M/UL — SIGNIFICANT CHANGE UP (ref 4.2–5.8)
RBC # FLD: 12.2 % — SIGNIFICANT CHANGE UP (ref 10.3–14.5)
SODIUM SERPL-SCNC: 137 MMOL/L — SIGNIFICANT CHANGE UP (ref 135–145)
WBC # BLD: 11.3 K/UL — HIGH (ref 3.8–10.5)
WBC # FLD AUTO: 11.3 K/UL — HIGH (ref 3.8–10.5)

## 2018-04-29 PROCEDURE — 83735 ASSAY OF MAGNESIUM: CPT

## 2018-04-29 PROCEDURE — 86901 BLOOD TYPING SEROLOGIC RH(D): CPT

## 2018-04-29 PROCEDURE — 83036 HEMOGLOBIN GLYCOSYLATED A1C: CPT

## 2018-04-29 PROCEDURE — 82570 ASSAY OF URINE CREATININE: CPT

## 2018-04-29 PROCEDURE — 87040 BLOOD CULTURE FOR BACTERIA: CPT

## 2018-04-29 PROCEDURE — 93005 ELECTROCARDIOGRAM TRACING: CPT

## 2018-04-29 PROCEDURE — 82962 GLUCOSE BLOOD TEST: CPT

## 2018-04-29 PROCEDURE — 80074 ACUTE HEPATITIS PANEL: CPT

## 2018-04-29 PROCEDURE — 84300 ASSAY OF URINE SODIUM: CPT

## 2018-04-29 PROCEDURE — 81003 URINALYSIS AUTO W/O SCOPE: CPT

## 2018-04-29 PROCEDURE — 70553 MRI BRAIN STEM W/O & W/DYE: CPT

## 2018-04-29 PROCEDURE — 86704 HEP B CORE ANTIBODY TOTAL: CPT

## 2018-04-29 PROCEDURE — 87070 CULTURE OTHR SPECIMN AEROBIC: CPT

## 2018-04-29 PROCEDURE — 80076 HEPATIC FUNCTION PANEL: CPT

## 2018-04-29 PROCEDURE — 82248 BILIRUBIN DIRECT: CPT

## 2018-04-29 PROCEDURE — 84100 ASSAY OF PHOSPHORUS: CPT

## 2018-04-29 PROCEDURE — 80048 BASIC METABOLIC PNL TOTAL CA: CPT

## 2018-04-29 PROCEDURE — 96374 THER/PROPH/DIAG INJ IV PUSH: CPT | Mod: XU

## 2018-04-29 PROCEDURE — 86850 RBC ANTIBODY SCREEN: CPT

## 2018-04-29 PROCEDURE — 80061 LIPID PANEL: CPT

## 2018-04-29 PROCEDURE — C1713: CPT

## 2018-04-29 PROCEDURE — 80053 COMPREHEN METABOLIC PANEL: CPT

## 2018-04-29 PROCEDURE — 81001 URINALYSIS AUTO W/SCOPE: CPT

## 2018-04-29 PROCEDURE — 70496 CT ANGIOGRAPHY HEAD: CPT

## 2018-04-29 PROCEDURE — 70450 CT HEAD/BRAIN W/O DYE: CPT

## 2018-04-29 PROCEDURE — 99233 SBSQ HOSP IP/OBS HIGH 50: CPT

## 2018-04-29 PROCEDURE — 99291 CRITICAL CARE FIRST HOUR: CPT | Mod: 25

## 2018-04-29 PROCEDURE — 84156 ASSAY OF PROTEIN URINE: CPT

## 2018-04-29 PROCEDURE — 85610 PROTHROMBIN TIME: CPT

## 2018-04-29 PROCEDURE — 86900 BLOOD TYPING SEROLOGIC ABO: CPT

## 2018-04-29 PROCEDURE — 87389 HIV-1 AG W/HIV-1&-2 AB AG IA: CPT

## 2018-04-29 PROCEDURE — 71045 X-RAY EXAM CHEST 1 VIEW: CPT

## 2018-04-29 PROCEDURE — 95819 EEG AWAKE AND ASLEEP: CPT

## 2018-04-29 PROCEDURE — 88307 TISSUE EXAM BY PATHOLOGIST: CPT

## 2018-04-29 PROCEDURE — 85027 COMPLETE CBC AUTOMATED: CPT

## 2018-04-29 PROCEDURE — C1889: CPT

## 2018-04-29 PROCEDURE — 97161 PT EVAL LOW COMPLEX 20 MIN: CPT

## 2018-04-29 PROCEDURE — 85730 THROMBOPLASTIN TIME PARTIAL: CPT

## 2018-04-29 RX ORDER — THIAMINE MONONITRATE (VIT B1) 100 MG
5 TABLET ORAL
Qty: 150 | Refills: 0 | OUTPATIENT
Start: 2018-04-29 | End: 2018-05-28

## 2018-04-29 RX ORDER — LEVETIRACETAM 250 MG/1
1 TABLET, FILM COATED ORAL
Qty: 180 | Refills: 1 | OUTPATIENT
Start: 2018-04-29 | End: 2018-10-25

## 2018-04-29 RX ORDER — AMLODIPINE BESYLATE 2.5 MG/1
1 TABLET ORAL
Qty: 90 | Refills: 1 | OUTPATIENT
Start: 2018-04-29 | End: 2018-10-25

## 2018-04-29 RX ORDER — FOLIC ACID 0.8 MG
1 TABLET ORAL
Qty: 0 | Refills: 0 | COMMUNITY
Start: 2018-04-29

## 2018-04-29 RX ORDER — ACETAMINOPHEN 500 MG
1 TABLET ORAL
Qty: 0 | Refills: 0 | COMMUNITY
Start: 2018-04-29

## 2018-04-29 RX ORDER — SENNA PLUS 8.6 MG/1
2 TABLET ORAL
Qty: 0 | Refills: 0 | COMMUNITY
Start: 2018-04-29

## 2018-04-29 RX ORDER — FOLIC ACID 0.8 MG
1 TABLET ORAL
Qty: 30 | Refills: 0 | OUTPATIENT
Start: 2018-04-29 | End: 2018-05-28

## 2018-04-29 RX ORDER — DOCUSATE SODIUM 100 MG
1 CAPSULE ORAL
Qty: 0 | Refills: 0 | COMMUNITY
Start: 2018-04-29

## 2018-04-29 RX ADMIN — Medication 100 MILLIGRAM(S): at 12:57

## 2018-04-29 RX ADMIN — AMLODIPINE BESYLATE 5 MILLIGRAM(S): 2.5 TABLET ORAL at 05:23

## 2018-04-29 RX ADMIN — HEPARIN SODIUM 5000 UNIT(S): 5000 INJECTION INTRAVENOUS; SUBCUTANEOUS at 12:58

## 2018-04-29 RX ADMIN — Medication 1 MILLIGRAM(S): at 12:57

## 2018-04-29 RX ADMIN — Medication 500 MILLIGRAM(S): at 12:57

## 2018-04-29 RX ADMIN — LEVETIRACETAM 1000 MILLIGRAM(S): 250 TABLET, FILM COATED ORAL at 05:23

## 2018-04-29 RX ADMIN — LEVETIRACETAM 1000 MILLIGRAM(S): 250 TABLET, FILM COATED ORAL at 16:36

## 2018-04-29 RX ADMIN — Medication 100 MILLIGRAM(S): at 05:23

## 2018-04-29 RX ADMIN — HEPARIN SODIUM 5000 UNIT(S): 5000 INJECTION INTRAVENOUS; SUBCUTANEOUS at 05:23

## 2018-04-29 RX ADMIN — Medication 1 TABLET(S): at 12:58

## 2018-04-29 NOTE — PROGRESS NOTE ADULT - PROBLEM SELECTOR PROBLEM 3
Elevated serum creatinine
Essential hypertension
Alcohol abuse
Essential hypertension

## 2018-04-29 NOTE — PROGRESS NOTE ADULT - PROBLEM SELECTOR PROBLEM 4
Alcohol abuse
Alcohol abuse
Cocaine abuse
Elevated serum creatinine
Alcohol abuse

## 2018-04-29 NOTE — PROGRESS NOTE ADULT - PROBLEM SELECTOR PLAN 7
Hb stable - outpt f/u for this chronic complaint w/GI
supplement

## 2018-04-29 NOTE — PROGRESS NOTE ADULT - SUBJECTIVE AND OBJECTIVE BOX
Patient is a 62y old  Male who presents with a chief complaint of seizure (2018 03:09)        SUBJECTIVE / OVERNIGHT EVENTS: no acute complaints. no fever/chills. no cp/sob/diarrhea/dysuria      MEDICATIONS  (STANDING):  amLODIPine   Tablet 5 milliGRAM(s) Oral daily  docusate sodium 100 milliGRAM(s) Oral three times a day  folic acid 1 milliGRAM(s) Oral daily  heparin  Injectable 5000 Unit(s) SubCutaneous every 8 hours  levETIRAcetam 1000 milliGRAM(s) Oral two times a day  multivitamin 1 Tablet(s) Oral daily  senna 2 Tablet(s) Oral at bedtime  thiamine 500 milliGRAM(s) Oral daily    MEDICATIONS  (PRN):  acetaminophen   Tablet. 325 milliGRAM(s) Oral every 4 hours PRN Mild Pain (1 - 3)      Vital Signs Last 24 Hrs  T(C): 37.1 (2018 08:24), Max: 37.8 (2018 23:38)  T(F): 98.8 (2018 08:24), Max: 100.1 (2018 23:38)  HR: 88 (2018 09:26) (80 - 96)  BP: 124/88 (2018 09:26) (117/56 - 149/75)  BP(mean): --  RR: 18 (2018 08:24) (18 - 18)  SpO2: 98% (2018 09:26) (97% - 99%)  CAPILLARY BLOOD GLUCOSE      POCT Blood Glucose.: 82 mg/dL (2018 08:52)    I&O's Summary    2018 07:01  -  2018 07:00  --------------------------------------------------------  IN: 500 mL / OUT: 1350 mL / NET: -850 mL      PHYSICAL EXAM  GENERAL: NAD, well-developed  EYES: conjunctiva and sclera clear  HEAD: L craniotomy with staples, c/d/i  NECK: Supple, No JVD  ENT: MMM  CHEST/LUNG: Clear to auscultation bilaterally; No wheeze  HEART: Regular rate and rhythm; No murmurs  ABDOMEN: Soft, Nontender, Nondistended; Bowel sounds present  EXTREMITIES:  2+ Peripheral Pulses, No clubbing, cyanosis, or edema  NEURO: nonfocal, AOx3 no pronator drift  PSYCH: calm   SKIN: No rashes or lesions    LABS:                        13.5   11.3  )-----------( 189      ( 2018 06:23 )             40.8     04-29    137  |  101  |  10  ----------------------------<  83  3.8   |  22  |  1.21    Ca    9.3      2018 06:23  Phos  2.2     -28  Mg     1.9     -            Urinalysis Basic - ( 2018 23:08 )    Color: PL Yellow / Appearance: Clear / S.010 / pH: x  Gluc: x / Ketone: Negative  / Bili: Negative / Urobili: Negative   Blood: x / Protein: Negative / Nitrite: Negative   Leuk Esterase: Negative / RBC: x / WBC x   Sq Epi: x / Non Sq Epi: x / Bacteria: x        RADIOLOGY & ADDITIONAL TESTS:    Imaging Personally Reviewed:   Consultant(s) Notes Reviewed: DIetician    Care Discussed with Consultants/Other Providers: d/w Neurosurgery LEAH Garcia regarding plan of care

## 2018-04-29 NOTE — DISCHARGE NOTE ADULT - CARE PROVIDER_API CALL
Amy Trujillo), [facility] Neurosurgery  General  [address] [address]  Phone: [phone]  Fax: [phone]

## 2018-04-29 NOTE — PROGRESS NOTE ADULT - PROBLEM SELECTOR PROBLEM 2
Essential hypertension
Seizure
Essential hypertension
Brain tumor
Essential hypertension

## 2018-04-29 NOTE — PROGRESS NOTE ADULT - PROBLEM SELECTOR PROBLEM 6
Hypophosphatemia
Prophylactic measure
Hypophosphatemia
Elevated serum creatinine

## 2018-04-29 NOTE — DISCHARGE NOTE ADULT - MEDICATION SUMMARY - MEDICATIONS TO TAKE
I will START or STAY ON the medications listed below when I get home from the hospital:    acetaminophen 325 mg oral tablet  -- 1 tab(s) by mouth every 4 hours, As needed, Mild Pain (1 - 3)  -- Indication: For Pain    levETIRAcetam 1000 mg oral tablet  -- 1 tab(s) by mouth 2 times a day  -- Indication: For Seizure    amLODIPine 5 mg oral tablet  -- 1 tab(s) by mouth once a day  -- Indication: For Hypertension    docusate sodium 100 mg oral capsule  -- 1 cap(s) by mouth 3 times a day  -- Indication: For Stool softener    senna oral tablet  -- 2 tab(s) by mouth once a day (at bedtime)  -- Indication: For Stool softener    Multiple Vitamins oral tablet  -- 1 tab(s) by mouth once a day  -- Indication: For Diet supplement    folic acid 1 mg oral tablet  -- 1 tab(s) by mouth once a day  -- Indication: For Diet supplement    thiamine 100 mg oral tablet  -- 5 tab(s) by mouth once a day  -- Indication: For DT prophylaxis

## 2018-04-29 NOTE — PROGRESS NOTE ADULT - SUBJECTIVE AND OBJECTIVE BOX
POD # 3 S/P Small Left craniectomy, Dural,SD biopsy of tumor     Overnight event: none    Vital Signs Last 24 Hrs  T(C): 37.1 (29 Apr 2018 08:24), Max: 37.8 (28 Apr 2018 23:38)  T(F): 98.8 (29 Apr 2018 08:24), Max: 100.1 (28 Apr 2018 23:38)  HR: 88 (29 Apr 2018 09:26) (80 - 96)  BP: 124/88 (29 Apr 2018 09:26) (117/56 - 149/75)  BP(mean): --  RR: 18 (29 Apr 2018 08:24) (18 - 18)  SpO2: 98% (29 Apr 2018 09:26) (97% - 99%)                          13.5   11.3  )-----------( 189      ( 29 Apr 2018 06:23 )             40.8    04-29    137  |  101  |  10  ----------------------------<  83  3.8   |  22  |  1.21    Ca    9.3      29 Apr 2018 06:23  Phos  2.2     04-28  Mg     1.9     04-28       Stroke Core Measures   Hemoglobin A1C, Whole Blood: 5.6 % (04-24 @ 08:02)    DRAIN OUTPUT:     NEUROIMAGING: MR Head w/wo IV Cont  Redemonstration of a left cerebral convexity extra-axial axial dural   based mass which predominately does not enhance, withunchanged mass   effect upon the left cerebral hemisphere. The differential diagnosis for   these findings includes sarcoidosis versus a partially calcified chronic   subdural hematoma which may contain areas of proteinaceous debris and   fibrosis. Neoplastic disease is considered less likely given lack of   associated enhancement and abnormality involving the overlying bone.     PHYSICAL EXAM:    General: No Acute Distress     Neurological: Awake, alert oriented to person, place and time, Following Commands, PERRL, EOMI, Face Symmetrical, Speech Fluent, Moving all extremities, Muscle Strength normal in all four extremities, No Drift, Sensation to Light Touch Intact    Pulmonary: Clear to Auscultation, No Rales, No Rhonchi, No Wheezes     Cardiovascular: S1, S2, Regular Rate and Rhythm     Gastrointestinal: Soft, Nontender, Nondistended     Incision: Craniectomy site intact    MEDICATIONS:   Antibiotics:    Neuro:  acetaminophen   Tablet. 325 milliGRAM(s) Oral every 4 hours PRN Mild Pain (1 - 3)  levETIRAcetam 1000 milliGRAM(s) Oral two times a day    Anticoagulation:  heparin  Injectable 5000 Unit(s) SubCutaneous every 8 hours    Cardiology:  amLODIPine   Tablet 5 milliGRAM(s) Oral daily    Endo:     Pulm:    GI/:  docusate sodium 100 milliGRAM(s) Oral three times a day  senna 2 Tablet(s) Oral at bedtime    Other:  folic acid 1 milliGRAM(s) Oral daily  multivitamin 1 Tablet(s) Oral daily  thiamine 500 milliGRAM(s) Oral daily

## 2018-04-29 NOTE — DISCHARGE NOTE ADULT - ADDITIONAL INSTRUCTIONS
Please see Dr Trujillo in a week for follow up Please see Dr Trujillo in a week for follow up  Please make an appointment with [facility] to see your doctor for High blood pressure

## 2018-04-29 NOTE — PROGRESS NOTE ADULT - PROBLEM SELECTOR PROBLEM 5
Hypokalemia
Elevated serum creatinine
Prophylactic measure
Cocaine abuse

## 2018-04-29 NOTE — DISCHARGE NOTE ADULT - PATIENT PORTAL LINK FT
You can access the [facility] Patient Portal, offered by [facility], by registering with the following website: [email or web address]

## 2018-04-29 NOTE — PROGRESS NOTE ADULT - PROBLEM SELECTOR PROBLEM 1
Brain tumor
SDH (subdural hematoma)
Brain tumor
Brain tumor
SDH (subdural hematoma)
Post-procedural fever
SDH (subdural hematoma)

## 2018-04-29 NOTE — PROGRESS NOTE ADULT - PROVIDER SPECIALTY LIST ADULT
Hospitalist
Hospitalist
NSICU
Neurosurgery
Hospitalist
Hospitalist
Neurosurgery
Hospitalist
Neurosurgery
Neurosurgery

## 2018-04-29 NOTE — PROGRESS NOTE ADULT - ATTENDING COMMENTS
okay to discharge from medical standpoint but patient will need close follow up for biopsy results and managment  Dr. JOHN Mata  Medicine Hospitalist  [phone]
Dr. [facility]Regional Medical Centerist  [phone]

## 2018-04-29 NOTE — DISCHARGE NOTE ADULT - HOSPITAL COURSE
This is a 62 year old male tx from [facility] history of cocaine use / ETOH abuse / marijuana use presented initially with seizures. Was found shaking, drooling with eyes open, unable to speak for 1 hour. Had similar episode as per friend prior in apartment prior to calling 911. Was given keppra / ativan with effect. CTH was completed at outside hospital with suggestion of chronic hematoma vs left extraxial mass. There is 2 mm of shift. On presentation at [facility] he is GCS 15. Severely dysarthric. Denies HA, cervical pain, blurry vision, nausea or vomiting. Hypertensive 203/115 in ED, was given labetalol with effect. Afebrile. No leukocytosis. This is a 62 year old male tx from [facility] on 4/22/18 . Presented with history of cocaine use / ETOH abuse / marijuana use, presented initially with seizures. He was found shaking, drooling with eyes open, unable to speak for 1 hour.Had similar episode as per his friend while  in his apartment prior to calling 911. He Was given keppra / ativan with effect. CTH was completed at outside hospital with suggestion of chronic hematoma vs left extraxial mass. There is 2 mm of shift.   On presentation at [facility] he is GCS 15. He was Severely dysarthric. Denies HA, cervical pain, blurry vision, nausea or vomiting.  He was Hypertensive 203/115 in ED, was treated with labetalol with effect.   Pt admits to years of ETOH, cocaine, marijuana use. He says he uses cocaine monthly and has never been hospitalized for cocaine related effects. He says last cocaine use was 1 week ago. He states he drinks E&J louise near daily throughout the day, has never been hospitalized for adverse consequences of alcoholism, never had withdrawal or seizures, says does not get shakes or other withdrawal symptoms when he takes a day  or days off from drinking, which pt states is frequently done.   He has no n/v/d/dysuria/vision changes. Pt notes occasional blood in stool for last few decades, no weakness or lightheadedness. He exercises occasionally with pushups and other strength conditioning. He lives at home with girlfriend  No recent medical care.  CTH: left frontal and high left fronto/parietal extraxial lesions vs SDH measuring 5.8cm and 5.9 cm respectively.  Follow up  CT Angio Head w/ IV Contrast showed  no abnormalities. No stenosis, occlusion, vascular malformation or aneurysm is seen. The visualized dural venous sinuses and cerebral veins are adequately patent.  Patient was taken to the OR on 4/24 for drainage of the SDH  when instead it was found that he has a subdural tumor of which biopsy was taken. Pathlogy is pending.   4/28 MR Head w/wo IV Cont is c/w a left cerebral convexity extra-axial axial dural based mass which predominately does not enhance, with unchanged mass   effect upon the left cerebral hemisphere. The differential diagnosis for these findings includes sarcoidosis versus a partially calcified chronic subdural hematoma which may contain areas of proteinaceous debris and   fibrosis. Neoplastic disease is considered less likely given lack of associated enhancement and abnormality involving the overlying bone.  wIill discharge him home today, He is to continue Keppra for seizure prophylaxis, Amlodipine for blood pressure control and vitamins for delirium tremens prophylaxis.   He is to see Dr Trujillo in one week for follow up. Patient was instructed as well to make an appointment with [facility] in [place] to see his medical doctor for blood pressure follow up This is a 62 year old male tx from [facility] on 4/22/18 . Presented with history of cocaine use / ETOH abuse / marijuana use, presented initially with seizures. He was found shaking, drooling with eyes open, unable to speak for 1 hour.Had similar episode as per his friend while  in his apartment prior to calling 911. He Was given keppra / ativan with effect. CTH was completed at outside hospital with suggestion of chronic hematoma vs left extraxial mass. There is 2 mm of shift.   On presentation at [facility] he is GCS 15. He was Severely dysarthric. Denies HA, cervical pain, blurry vision, nausea or vomiting.  He was Hypertensive 203/115 in ED, was treated with labetalol with effect.   Pt admits to years of ETOH, cocaine, marijuana use. He says he uses cocaine monthly and has never been hospitalized for cocaine related effects. He says last cocaine use was 1 week ago. He states he drinks E&J louise near daily throughout the day, has never been hospitalized for adverse consequences of alcoholism, never had withdrawal or seizures, says does not get shakes or other withdrawal symptoms when he takes a day  or days off from drinking, which pt states is frequently done.   He has no n/v/d/dysuria/vision changes. Pt notes occasional blood in stool for last few decades, no weakness or lightheadedness. He exercises occasionally with pushups and other strength conditioning. He lives at home with girlfriend  No recent medical care.  CTH: left frontal and high left fronto/parietal extraxial lesions vs SDH measuring 5.8cm and 5.9 cm respectively.  Follow up  CT Angio Head w/ IV Contrast showed  no abnormalities. No stenosis, occlusion, vascular malformation or aneurysm is seen. The visualized dural venous sinuses and cerebral veins are adequately patent.  Patient was taken to the OR on 4/24 for drainage of the SDH  when instead it was found that he has a subdural tumor of which biopsy was taken. Pathlogy is pending.   4/28 MR Head w/wo IV Cont is c/w a left cerebral convexity extra-axial axial dural based mass which predominately does not enhance, with unchanged mass   effect upon the left cerebral hemisphere. The differential diagnosis for these findings includes sarcoidosis versus a partially calcified chronic subdural hematoma which may contain areas of proteinaceous debris and   fibrosis. Neoplastic disease is considered less likely given lack of associated enhancement and abnormality involving the overlying bone.  On admission his creatinine was elevated but improved with fluids, on 4/28 he has a low grade fever with negative work up for 24hours. He was cleared by hospitalist for discharge.   Case was discussed with attending who agreed to the plan.   will discharge him home today, He is to continue Keppra for seizure prophylaxis, Amlodipine for blood pressure control and vitamins for delirium tremens prophylaxis.   He is to see Dr Trujillo in one week for follow up. Patient was instructed as well to make an appointment with [facility] in [place] to see his medical doctor for blood pressure follow up

## 2018-04-29 NOTE — DISCHARGE NOTE ADULT - PLAN OF CARE
Stable blood pressure Continue Amlodipine Stop Alcohol abuse Continue DT prophylaxis To go home Lt craniectomy, brain tumor biopsy Seizure free Continue Keppra

## 2018-04-29 NOTE — DISCHARGE NOTE ADULT - NS AS ACTIVITY OBS
Stairs allowed/Do not make important decisions/Bathing allowed/Showering allowed/No Heavy lifting/straining/Walking-Indoors allowed/Keep wound dry/Do not drive or operate machinery/Walking-Outdoors allowed

## 2018-04-29 NOTE — DISCHARGE NOTE ADULT - CARE PLAN
Principal Discharge DX:	Brain tumor  Goal:	To go home  Assessment and plan of treatment:	Lt craniectomy, brain tumor biopsy  Secondary Diagnosis:	Seizure  Goal:	Seizure free  Assessment and plan of treatment:	Continue Keppra  Secondary Diagnosis:	Essential hypertension  Goal:	Stable blood pressure  Assessment and plan of treatment:	Continue Amlodipine  Secondary Diagnosis:	ETOH abuse  Goal:	Stop Alcohol abuse  Assessment and plan of treatment:	Continue DT prophylaxis

## 2018-04-29 NOTE — PROGRESS NOTE ADULT - PROBLEM SELECTOR PLAN 1
POD # 3 S/P Small Left craniectomy, Dural,SD biopsy of tumor  Pathology pending  Will continue Keppra for seizure prophylaxis  PT eval done patient has no need for PT  D/C planning

## 2018-05-01 LAB
CULTURE RESULTS: SIGNIFICANT CHANGE UP
SPECIMEN SOURCE: SIGNIFICANT CHANGE UP

## 2018-05-03 LAB
CULTURE RESULTS: SIGNIFICANT CHANGE UP
CULTURE RESULTS: SIGNIFICANT CHANGE UP
SPECIMEN SOURCE: SIGNIFICANT CHANGE UP
SPECIMEN SOURCE: SIGNIFICANT CHANGE UP

## 2018-05-07 ENCOUNTER — APPOINTMENT (OUTPATIENT)
Dept: NEUROSURGERY | Facility: CLINIC | Age: 63
End: 2018-05-07
Payer: COMMERCIAL

## 2018-05-07 VITALS
SYSTOLIC BLOOD PRESSURE: 145 MMHG | BODY MASS INDEX: 27 KG/M2 | DIASTOLIC BLOOD PRESSURE: 78 MMHG | HEART RATE: 73 BPM | HEIGHT: 66 IN | WEIGHT: 168 LBS

## 2018-05-07 PROCEDURE — 99024 POSTOP FOLLOW-UP VISIT: CPT

## 2018-05-08 RX ORDER — MULTIVITAMIN
TABLET ORAL
Refills: 0 | Status: ACTIVE | COMMUNITY

## 2018-05-08 RX ORDER — FOLIC ACID 1 MG/1
1 TABLET ORAL
Refills: 0 | Status: ACTIVE | COMMUNITY

## 2018-05-08 RX ORDER — AMLODIPINE BESYLATE 5 MG/1
5 TABLET ORAL
Refills: 0 | Status: ACTIVE | COMMUNITY

## 2018-05-15 ENCOUNTER — OTHER (OUTPATIENT)
Age: 63
End: 2018-05-15

## 2018-05-21 ENCOUNTER — OTHER (OUTPATIENT)
Age: 63
End: 2018-05-21

## 2018-05-29 ENCOUNTER — OTHER (OUTPATIENT)
Age: 63
End: 2018-05-29

## 2018-06-29 ENCOUNTER — OTHER (OUTPATIENT)
Age: 63
End: 2018-06-29

## 2018-07-02 ENCOUNTER — APPOINTMENT (OUTPATIENT)
Dept: MRI IMAGING | Facility: CLINIC | Age: 63
End: 2018-07-02
Payer: COMMERCIAL

## 2018-07-02 ENCOUNTER — OUTPATIENT (OUTPATIENT)
Dept: OUTPATIENT SERVICES | Facility: HOSPITAL | Age: 63
LOS: 1 days | End: 2018-07-02
Payer: COMMERCIAL

## 2018-07-02 DIAGNOSIS — Z00.8 ENCOUNTER FOR OTHER GENERAL EXAMINATION: ICD-10-CM

## 2018-07-02 PROCEDURE — 82565 ASSAY OF CREATININE: CPT

## 2018-07-02 PROCEDURE — A9585: CPT

## 2018-07-02 PROCEDURE — 70553 MRI BRAIN STEM W/O & W/DYE: CPT | Mod: 26

## 2018-07-02 PROCEDURE — 70553 MRI BRAIN STEM W/O & W/DYE: CPT

## 2018-07-06 ENCOUNTER — OTHER (OUTPATIENT)
Age: 63
End: 2018-07-06

## 2018-08-02 ENCOUNTER — APPOINTMENT (OUTPATIENT)
Dept: NEUROSURGERY | Facility: CLINIC | Age: 63
End: 2018-08-02
Payer: MEDICARE

## 2018-08-02 VITALS
DIASTOLIC BLOOD PRESSURE: 78 MMHG | HEART RATE: 64 BPM | BODY MASS INDEX: 28.93 KG/M2 | HEIGHT: 66 IN | SYSTOLIC BLOOD PRESSURE: 134 MMHG | WEIGHT: 180 LBS

## 2018-08-02 PROCEDURE — 99213 OFFICE O/P EST LOW 20 MIN: CPT

## 2018-11-26 NOTE — EEG REPORT - NS EEG TEXT BOX
[facility]  Report of Routine EEG with Video    [facility]: [address] [place] [address], Phone: [phone]  [facility]: [address] [place] [address], [place] [address], Phone: [phone]  Office: [address], [address], [place] [address], Phone: [phone]    Patient Name: Matthew Banegas     Age: 62 y  : 1955  Patient ID: -, MRN #: MR#  [id number], Location: [address] [place] [address]  Referring Physician: -    EEG #: [id number]  Study Date: 2018		    Technical Information:					  On Instrument: -  Placement and Labeling of Electrodes:  The EEG was performed utilizing 20 channels referential EEG connections (coronal over temporal over parasagittal montage) using all standard 10-20 electrode placements with EKG.  Recording was at a sampling rate of 256 samples per second per channel.  Time synchronized digital video recording was done simultaneously with EEG recording.  A low light infrared camera was used for low light recording.  Jay and seizure detection algorithms were utilized.    History:  p/w    seizure   h/o      cocaine use , ETOH abuse     Medication	  Keppra	  Ativan PRN 	    Study Interpretation:    FINDINGS:  The background was continuous, spontaneously variable and reactive. During wakefulness, the posteriorly dominant rhythm consisted of symmetric, 9-10 Hz activity, with amplitude to 30 uV, that attenuated to eye opening. There is diffuse excess beta activity.     Background Slowing:  No generalized background slowing was present.    Focal Slowing:   None was present.    Sleep Background:  Drowsiness was characterized by fragmentation, attenuation, and slowing of the background activity.    Stage II sleep transients were not recorded.    Other Non-Epileptiform Findings:  None were present.    Interictal Epileptiform Activity:   None were present.    Events:  Clinical events: None recorded.  Seizures: None recorded.    Activation Procedures:   Hyperventilation was not performed.    Photic stimulation was not performed..    Artifacts:  Intermittent myogenic and movement artifacts were noted.    ECG:  The heart rate on single channel ECG was predominantly between 70-80 BPM.    EEG Summary / Classification:  Otherwise normal EEG in the awake and drowsy states except for diffuse excess beta activity.    EEG Impression / Clinical Correlate:  Diffuse excess beta activity may be seen with medication use such as benzodiazepines and barbiturates. There were no epileptiform abnormalities recorded.        Preliminary Fellow Read:  Odessa Douglas MD  ________________________________________  Neurophysiology/Epilepsy Fellow, [facility] [place]

## 2019-08-19 ENCOUNTER — APPOINTMENT (OUTPATIENT)
Dept: NEUROSURGERY | Facility: CLINIC | Age: 64
End: 2019-08-19
Payer: MEDICARE

## 2019-08-19 VITALS
HEART RATE: 67 BPM | DIASTOLIC BLOOD PRESSURE: 80 MMHG | HEIGHT: 66 IN | WEIGHT: 182 LBS | BODY MASS INDEX: 29.25 KG/M2 | SYSTOLIC BLOOD PRESSURE: 146 MMHG

## 2019-08-19 DIAGNOSIS — Z87.898 PERSONAL HISTORY OF OTHER SPECIFIED CONDITIONS: ICD-10-CM

## 2019-08-19 DIAGNOSIS — R56.9 UNSPECIFIED CONVULSIONS: ICD-10-CM

## 2019-08-19 DIAGNOSIS — I10 ESSENTIAL (PRIMARY) HYPERTENSION: ICD-10-CM

## 2019-08-19 DIAGNOSIS — Z78.9 OTHER SPECIFIED HEALTH STATUS: ICD-10-CM

## 2019-08-19 DIAGNOSIS — I62.03 NONTRAUMATIC CHRONIC SUBDURAL HEMORRHAGE: ICD-10-CM

## 2019-08-19 PROCEDURE — 99213 OFFICE O/P EST LOW 20 MIN: CPT

## 2019-08-20 PROBLEM — I10 HIGH BLOOD PRESSURE: Status: RESOLVED | Noted: 2018-05-08 | Resolved: 2019-08-20

## 2019-08-20 PROBLEM — I62.03 SUBDURAL HEMATOMA, CHRONIC: Status: ACTIVE | Noted: 2018-05-07

## 2019-08-20 PROBLEM — R56.9 SEIZURE: Status: ACTIVE | Noted: 2018-07-06

## 2019-08-20 PROBLEM — Z78.9 NON-SMOKER: Status: ACTIVE | Noted: 2019-08-20

## 2019-08-20 PROBLEM — Z87.898 FORMER CONSUMPTION OF ALCOHOL: Status: ACTIVE | Noted: 2018-08-02

## 2019-08-20 RX ORDER — LEVETIRACETAM 1000 MG/1
1000 TABLET, FILM COATED ORAL TWICE DAILY
Qty: 60 | Refills: 0 | Status: COMPLETED | COMMUNITY
Start: 2018-07-06 | End: 2019-08-20

## 2019-08-20 RX ORDER — LEVETIRACETAM 500 MG/1
500 TABLET, FILM COATED ORAL TWICE DAILY
Qty: 180 | Refills: 0 | Status: COMPLETED | COMMUNITY
Start: 2018-08-02 | End: 2019-08-20

## 2019-08-20 RX ORDER — LEVETIRACETAM 1000 MG/1
1000 TABLET, FILM COATED ORAL
Refills: 0 | Status: COMPLETED | COMMUNITY
End: 2019-08-20

## 2019-08-22 NOTE — PHYSICAL EXAM
[General Appearance - Alert] : alert [General Appearance - In No Acute Distress] : in no acute distress [General Appearance - Well Nourished] : well nourished [Longitudinal] : longitudinal [Clean] : clean [Dry] : dry [Well-Healed] : well-healed [No Drainage] : without drainage [Normal Skin] : normal [Oriented To Time, Place, And Person] : oriented to person, place, and time [Impaired Insight] : insight and judgment were intact [Affect] : the affect was normal [Memory Recent] : recent memory was not impaired [Person] : oriented to person [Place] : oriented to place [Time] : oriented to time [Registration Intact] : recent registration memory intact [Span Intact] : the attention span was normal [Fluency] : fluency intact [Comprehension] : comprehension intact [Current Events] : adequate knowledge of current events [Past History] : adequate knowledge of personal past history [Vocabulary] : adequate range of vocabulary [I: Normal Smell] : smell intact bilaterally [Cranial Nerves Optic (II)] : visual acuity intact bilaterally,  pupils equal round and reactive to light [Cranial Nerves Oculomotor (III)] : extraocular motion intact [Cranial Nerves Trigeminal (V)] : facial sensation intact symmetrically [Cranial Nerves Facial (VII)] : face symmetrical [Cranial Nerves Vestibulocochlear (VIII)] : hearing was intact bilaterally [Cranial Nerves Glossopharyngeal (IX)] : tongue and palate midline [Cranial Nerves Accessory (XI - Cranial And Spinal)] : head turning and shoulder shrug symmetric [Cranial Nerves Hypoglossal (XII)] : there was no tongue deviation with protrusion [Motor Strength] : muscle strength was normal in all four extremities [5] : S1 toe walking 5/5 [Sensation Tactile Decrease] : light touch was intact [Balance] : balance was intact [1+] : Patella left 1+ [No Visual Abnormalities] : no visible abnormailities [Normal] : normal [Able to toe walk] : the patient was able to toe walk [Able to heel walk] : the patient was able to heel walk [Sclera] : the sclera and conjunctiva were normal [PERRL With Normal Accommodation] : pupils were equal in size, round, reactive to light, with normal accommodation [Outer Ear] : the ears and nose were normal in appearance [Hearing Threshold Finger Rub Not [place]] : hearing was normal [Neck Appearance] : the appearance of the neck was normal [] : no respiratory distress [Exaggerated Use Of Accessory Muscles For Inspiration] : no accessory muscle use [No CVA Tenderness] : no ~M costovertebral angle tenderness [Edema] : there was no peripheral edema [No Spinal Tenderness] : no spinal tenderness [Abnormal Walk] : normal gait [Motor Tone] : muscle strength and tone were normal [Short Term Intact] : short term memory intact [Remote Intact] : remote memory intact [Reading] : reading intact [Concentration Intact] : normal concentrating ability [Romberg's Sign] : Romberg's sign was negtive [Limited Balance] : balance was intact [Turcios] : Turcios's sign was not demonstrated [FreeTextEntry1] : small raised mole on left side cheek without discharge/tenderness/swelling

## 2019-08-22 NOTE — ASSESSMENT
[FreeTextEntry1] : This is a 63 yo male with h/o seizure and spontaneous chronic SDH who is s/p craniectomy ~16 months ago. He had excellent recovery and asymptomatic at present. He is neurologically intact. I have recommended\par - Repeat CTH wo asap\par - refer to dermatology for abnormal mole on face (patient states it has been noticed for years but was never evaluated)\par - RTC PRN if CT stable

## 2019-08-22 NOTE — HISTORY OF PRESENT ILLNESS
[FreeTextEntry1] : Mr. ROXANN VAN is a 65 yo male with history of HTN, ETOH abuse, cocaine and marijuana use who was initially brought to [facility] on 4/22/18 for seizure like activity (shaking and drooling with eyes open) where initial CTH revealed left frontal and high left frontoparietal extra-axial lesion concerning for SDH and he was severely dysarthric/hypertensive (203/115) in ED. On 4/24/18, he underwent left craniectomy for brain biopsy and final pathology showed calcified chronic subdural hematoma. Post operative hospital stay was uneventful. Repeat MRI brain w/wo on 7/2/18 showed stable left frontoparietal extra exial lesion consistent with calcified chronic SDH. Today he returns for routine follow up and states he is doing well. He stopped Keppra 3 months after hospital discharge and currently not on any blood thinner. He denies seizure, headache, dizziness, n/v, visual change, weakness, gait disturbance or any other neuro deficits. Surgical incision appears well healed.

## 2019-08-23 ENCOUNTER — OTHER (OUTPATIENT)
Age: 64
End: 2019-08-23

## 2019-08-25 ENCOUNTER — FORM ENCOUNTER (OUTPATIENT)
Age: 64
End: 2019-08-25

## 2019-08-26 ENCOUNTER — APPOINTMENT (OUTPATIENT)
Dept: CT IMAGING | Facility: CLINIC | Age: 64
End: 2019-08-26
Payer: MEDICARE

## 2019-08-26 ENCOUNTER — OUTPATIENT (OUTPATIENT)
Dept: OUTPATIENT SERVICES | Facility: HOSPITAL | Age: 64
LOS: 1 days | End: 2019-08-26
Payer: COMMERCIAL

## 2019-08-26 DIAGNOSIS — Z00.8 ENCOUNTER FOR OTHER GENERAL EXAMINATION: ICD-10-CM

## 2019-08-26 PROCEDURE — 70450 CT HEAD/BRAIN W/O DYE: CPT

## 2019-08-26 PROCEDURE — 70450 CT HEAD/BRAIN W/O DYE: CPT | Mod: 26
